# Patient Record
Sex: FEMALE | Race: WHITE | NOT HISPANIC OR LATINO
[De-identification: names, ages, dates, MRNs, and addresses within clinical notes are randomized per-mention and may not be internally consistent; named-entity substitution may affect disease eponyms.]

---

## 2017-03-01 ENCOUNTER — APPOINTMENT (OUTPATIENT)
Dept: ENDOCRINOLOGY | Facility: CLINIC | Age: 62
End: 2017-03-01

## 2019-02-25 ENCOUNTER — RECORD ABSTRACTING (OUTPATIENT)
Age: 64
End: 2019-02-25

## 2019-02-25 DIAGNOSIS — Z85.9 PERSONAL HISTORY OF MALIGNANT NEOPLASM, UNSPECIFIED: ICD-10-CM

## 2019-02-25 DIAGNOSIS — Z83.3 FAMILY HISTORY OF DIABETES MELLITUS: ICD-10-CM

## 2019-02-25 DIAGNOSIS — Z87.09 PERSONAL HISTORY OF OTHER DISEASES OF THE RESPIRATORY SYSTEM: ICD-10-CM

## 2019-02-25 DIAGNOSIS — H65.93 UNSPECIFIED NONSUPPURATIVE OTITIS MEDIA, BILATERAL: ICD-10-CM

## 2019-02-25 DIAGNOSIS — Z86.39 PERSONAL HISTORY OF OTHER ENDOCRINE, NUTRITIONAL AND METABOLIC DISEASE: ICD-10-CM

## 2019-02-25 DIAGNOSIS — H61.21 IMPACTED CERUMEN, RIGHT EAR: ICD-10-CM

## 2019-02-25 DIAGNOSIS — Z80.9 FAMILY HISTORY OF MALIGNANT NEOPLASM, UNSPECIFIED: ICD-10-CM

## 2019-02-25 DIAGNOSIS — Z87.39 PERSONAL HISTORY OF OTHER DISEASES OF THE MUSCULOSKELETAL SYSTEM AND CONNECTIVE TISSUE: ICD-10-CM

## 2019-02-25 DIAGNOSIS — R06.7 SNEEZING: ICD-10-CM

## 2019-02-25 DIAGNOSIS — Z87.19 PERSONAL HISTORY OF OTHER DISEASES OF THE DIGESTIVE SYSTEM: ICD-10-CM

## 2019-02-25 DIAGNOSIS — Z87.898 PERSONAL HISTORY OF OTHER SPECIFIED CONDITIONS: ICD-10-CM

## 2019-02-25 DIAGNOSIS — H65.02 ACUTE SEROUS OTITIS MEDIA, LEFT EAR: ICD-10-CM

## 2019-02-25 DIAGNOSIS — Z82.3 FAMILY HISTORY OF STROKE: ICD-10-CM

## 2019-02-25 DIAGNOSIS — L08.9 LOCAL INFECTION OF THE SKIN AND SUBCUTANEOUS TISSUE, UNSPECIFIED: ICD-10-CM

## 2019-02-25 DIAGNOSIS — Z82.49 FAMILY HISTORY OF ISCHEMIC HEART DISEASE AND OTHER DISEASES OF THE CIRCULATORY SYSTEM: ICD-10-CM

## 2019-02-25 DIAGNOSIS — Z86.79 PERSONAL HISTORY OF OTHER DISEASES OF THE CIRCULATORY SYSTEM: ICD-10-CM

## 2019-02-25 DIAGNOSIS — Z86.69 PERSONAL HISTORY OF OTHER DISEASES OF THE NERVOUS SYSTEM AND SENSE ORGANS: ICD-10-CM

## 2019-02-25 DIAGNOSIS — Z87.891 PERSONAL HISTORY OF NICOTINE DEPENDENCE: ICD-10-CM

## 2019-02-25 DIAGNOSIS — J34.89 OTHER SPECIFIED DISORDERS OF NOSE AND NASAL SINUSES: ICD-10-CM

## 2019-02-25 RX ORDER — METHENAMINE HIPPURATE 1 G/1
1 TABLET ORAL
Refills: 0 | Status: ACTIVE | COMMUNITY

## 2019-02-25 RX ORDER — METFORMIN HYDROCHLORIDE 625 MG/1
TABLET ORAL
Refills: 0 | Status: ACTIVE | COMMUNITY

## 2019-02-25 RX ORDER — ATORVASTATIN CALCIUM 80 MG/1
TABLET, FILM COATED ORAL
Refills: 0 | Status: ACTIVE | COMMUNITY

## 2019-02-25 RX ORDER — HYDROCHLOROTHIAZIDE 12.5 MG/1
TABLET ORAL
Refills: 0 | Status: ACTIVE | COMMUNITY

## 2019-02-25 RX ORDER — ACETAMINOPHEN 325 MG/1
TABLET, FILM COATED ORAL
Refills: 0 | Status: ACTIVE | COMMUNITY

## 2019-02-25 RX ORDER — SITAGLIPTIN 100 MG/1
TABLET, FILM COATED ORAL
Refills: 0 | Status: ACTIVE | COMMUNITY

## 2019-02-25 RX ORDER — PIOGLITAZONE HYDROCHLORIDE 45 MG/1
TABLET ORAL
Refills: 0 | Status: ACTIVE | COMMUNITY

## 2019-02-25 RX ORDER — GLIMEPIRIDE 4 MG/1
TABLET ORAL
Refills: 0 | Status: ACTIVE | COMMUNITY

## 2019-02-25 RX ORDER — VALSARTAN 40 MG/1
TABLET, COATED ORAL
Refills: 0 | Status: ACTIVE | COMMUNITY

## 2019-03-19 ENCOUNTER — APPOINTMENT (OUTPATIENT)
Dept: OTOLARYNGOLOGY | Facility: CLINIC | Age: 64
End: 2019-03-19
Payer: COMMERCIAL

## 2019-03-19 VITALS
BODY MASS INDEX: 42.49 KG/M2 | HEIGHT: 65 IN | DIASTOLIC BLOOD PRESSURE: 80 MMHG | WEIGHT: 255 LBS | SYSTOLIC BLOOD PRESSURE: 150 MMHG | HEART RATE: 90 BPM

## 2019-03-19 VITALS — HEART RATE: 84 BPM | SYSTOLIC BLOOD PRESSURE: 120 MMHG | DIASTOLIC BLOOD PRESSURE: 66 MMHG

## 2019-03-19 VITALS — DIASTOLIC BLOOD PRESSURE: 70 MMHG | SYSTOLIC BLOOD PRESSURE: 116 MMHG

## 2019-03-19 PROCEDURE — 31231 NASAL ENDOSCOPY DX: CPT

## 2019-03-19 PROCEDURE — 99213 OFFICE O/P EST LOW 20 MIN: CPT | Mod: 25

## 2019-03-19 NOTE — ASSESSMENT
[FreeTextEntry1] : It was my impression that her symptoms were most consistent with the flu and I did not see evidence of a purulent sinusitis.  I would treatment with antibiotics should she develop a secondary bacterial infection but I did not so recommend at this point.\par I explained when she is sick her underlying vertigo may exacerbate and this should improve as she feels better. If not I would plan vestibular therapy again.\par Otherwise, I recommended supportive care.\par I suggested topical moisturizing for the nasal cavity and would like to see her back in followup in 2-3 weeks to make sure that she is not developing a secondary sinus infection.

## 2019-03-19 NOTE — CONSULT LETTER
[Dear  ___] : Dear  [unfilled], [Courtesy Letter:] : I had the pleasure of seeing your patient, [unfilled], in my office today. [Please see my note below.] : Please see my note below. [Consult Closing:] : Thank you very much for allowing me to participate in the care of this patient.  If you have any questions, please do not hesitate to contact me. [Sincerely,] : Sincerely, [FreeTextEntry3] : Sidney Ley MD\par [facility]\par [facility]\par  [facility]\par \par

## 2019-03-19 NOTE — HISTORY OF PRESENT ILLNESS
[de-identified] : EDWIN JALLOH is a 63 year old female who comes in a history of chronic sinusitis and ozenoid changes of the left antrum and sensory neural hearing losses.  She notes that she has been sick for about a week with fevers, muscle aches and chills. Her sugars have been high and her dizziness has exacerbated. She was started on Tamiflu but is concerned that she may have a sinus infection. The patient had no other ear nose or throat complaints at this visit.\par

## 2019-03-19 NOTE — PHYSICAL EXAM
[Normal] : normal appearance, well groomed, well nourished, and in no acute distress [FreeTextEntry1] : General:\par The patient was alert and oriented and in no distress.\par Voice was clear.\par Face:\par The patient had no facial asymmetry or mass.\par The skin was unremarkable.\par Ears:\par The external ears were normal without deformity.\par The ear canals were clear.\par The tympanic membranes were intact and normal.\par Neuro:\par Neurologically, the patient was awake, alert, and oriented to person, place and time. There were no obvious focal neurologic abnormalities.  Cranial nerves II through XII were grossly intact.Nasal endoscopy: \par CPT 79288\par Procedure Note:\par \par Nasal endoscopy was done with topical anesthesia of Pontocaine and Afrin and a      nasal endoscope.\par Indication: Nasal congestion, rule out sinusitis.\par Procedure: The nasal cavity was anesthetized with topical Afrin and Pontocaine. An  endoscope was used and inserted into the nasal cavity.\par Attention was first paid to the anterior nasal cavity.\par \par This showed that the nasal mucosa was dry crusting throughout bilaterally but without purulent sinusitis. The crusting was removed.

## 2019-04-04 ENCOUNTER — RX CHANGE (OUTPATIENT)
Age: 64
End: 2019-04-04

## 2019-04-09 ENCOUNTER — APPOINTMENT (OUTPATIENT)
Dept: OTOLARYNGOLOGY | Facility: CLINIC | Age: 64
End: 2019-04-09
Payer: MEDICARE

## 2019-04-09 VITALS
BODY MASS INDEX: 42.49 KG/M2 | WEIGHT: 255 LBS | HEART RATE: 88 BPM | SYSTOLIC BLOOD PRESSURE: 126 MMHG | HEIGHT: 65 IN | DIASTOLIC BLOOD PRESSURE: 66 MMHG

## 2019-04-09 PROCEDURE — 31237 NSL/SINS NDSC SURG BX POLYPC: CPT

## 2019-04-09 PROCEDURE — 99213 OFFICE O/P EST LOW 20 MIN: CPT | Mod: 25

## 2019-04-09 NOTE — CONSULT LETTER
[Dear  ___] : Dear  [unfilled], [Courtesy Letter:] : I had the pleasure of seeing your patient, [unfilled], in my office today. [Please see my note below.] : Please see my note below. [Consult Closing:] : Thank you very much for allowing me to participate in the care of this patient.  If you have any questions, please do not hesitate to contact me. [Sincerely,] : Sincerely, [FreeTextEntry3] : \par Sidney Ley MD\par [facility]\par [facility]\par  [facility]\par \par  [FreeTextEntry2] : MIKAEL DAMON\par

## 2019-04-09 NOTE — PHYSICAL EXAM
[FreeTextEntry1] : General:\par The patient was alert and oriented and in no distress.\par Voice was clear.\par \par Face:\par The patient had no facial asymmetry or mass.\par The skin was unremarkable. She had the orbital asymmetry\par \par Ears:\par The external ears were normal without deformity.\par The ear canals were clear.\par The tympanic membranes were intact and normal.\par \par Neck: \par The neck was symmetrical.\par The parotid and submandibular glands were normal without masses.\par The trachea was midline and there was no unusual crepitus.\par The thyroid was smooth and nontender and no masses were palpated.\par There was no significant cervical adenopathy.\par \par Oral cavity:\par The oral mucosa was normal.\par The oral and base of tongue were clear and without mass.\par The gingival and buccal mucosa were moist and without lesions.\par The palate moved well.\par There was no cleft to the palate.\par There appeared to be good salivary flow.  \par There was no pus, erythema or mass in the oral cavity.\par \par Neuro:\par Neurologically, the patient was awake, alert, and oriented to person, place and time. There were no obvious focal neurologic abnormalities.  Cranial nerves II through XII were grossly intact.\par \par Nasal endoscopy: \par CPT 51339\par Procedure Note:\par \par Nasal endoscopy was done with topical anesthesia of Pontocaine and Afrin and a      nasal endoscope.\par Indication: Nasal congestion, evaluation of response to therapy for sinusitis.\par Procedure: The nasal cavity was anesthetized with topical Afrin and Pontocaine. An  endoscope was used and inserted into the nasal cavity.\par Attention was first paid to the anterior nasal cavity.\par On the right side, the sinuses were patent and the mucosa was much better without polyps purulence or masses.\par On the left, there was a yellow green crust around the entire left antrum.\par Switching to a rigid 30° pediatric endoscope with further anesthesia and a forward biting forceps the crust was removed and then with a curved suction the residual mucosal discharge was cleared.\par

## 2019-04-09 NOTE — ASSESSMENT
[FreeTextEntry1] : It was my impression that she had had a sinusitis that responded to medical care. However, she still has the crusting of the Ozena obstructing the left maxillary sinus. This was debrided to clear and then suctioned.\par At this point I did not suggest other antibiotics. I recommend continuing with nasal rinses and would like to reevaluate in 3 weeks to see if she needs further debridement.

## 2019-04-09 NOTE — HISTORY OF PRESENT ILLNESS
[de-identified] : EDWIN JALLOH Was seen in followup on April 9. She wound up taking the Ceftin and was feeling much better. She takes Diflucan as well. She comes in for posttreatment evaluation.

## 2019-04-24 ENCOUNTER — APPOINTMENT (OUTPATIENT)
Dept: ORTHOPEDIC SURGERY | Facility: CLINIC | Age: 64
End: 2019-04-24
Payer: MEDICARE

## 2019-04-24 VITALS — WEIGHT: 255 LBS | HEIGHT: 65 IN | BODY MASS INDEX: 42.49 KG/M2

## 2019-04-24 DIAGNOSIS — M19.112 POST-TRAUMATIC OSTEOARTHRITIS, LEFT SHOULDER: ICD-10-CM

## 2019-04-24 PROCEDURE — 99214 OFFICE O/P EST MOD 30 MIN: CPT

## 2019-04-24 NOTE — HISTORY OF PRESENT ILLNESS
[Pain Location] : pain [] : left shoulder [Stable] : stable [Intermit.] : ~He/She~ states the symptoms seem to be intermittent [NSAIDs] : relieved by nonsteroidal anti-inflammatory drugs [Opioid Analgesics] : relieved by opioid analgesics [de-identified] : FOLLOW UP\par LEFT SHOULDER\par LEFT SHOULDER JUNE 11, 2015- LEFT ORIF PROXIMAL HUMERUS WITH CORAL GRAFT\par INTERMITTENT PAIN\par PAIN LEVEL 3-4/10\par DULL, ACHY PAIN\par \par \par BETTER WITH MEDICATION- TYLENOL, TRAMADOL, PERCOCET\par WORSE WHEN LAYING DOWN [de-identified] : LAYING DOWN

## 2019-04-24 NOTE — PHYSICAL EXAM
[de-identified] : PHYSICAL EXAM LEFT  SHOULDER\par \par SCAPULAR PROTRACTION\par AROM 100 / 80 \par TENDER: SA REGION / GH JOINT \par \par SPECIAL TESTING :\par GALDAMEZ - POSITIVE \par CHANDA - POSITIVE \par SPEED TEST - POSITIVE\par \par [place]AM - NEGATIVE \par APPREHENSION AND SUPPRESSION - NEGATIVE \par \par RC STRENGTH TESTING \par SS:  5/5\par SUB 5/5\par IS     5/5\par BICEPS  5/5\par \par SENSATION  - GROSSLY INTACT\par \par \par

## 2019-05-02 ENCOUNTER — APPOINTMENT (OUTPATIENT)
Dept: OTOLARYNGOLOGY | Facility: CLINIC | Age: 64
End: 2019-05-02
Payer: MEDICARE

## 2019-05-02 VITALS — HEIGHT: 65 IN | BODY MASS INDEX: 42.49 KG/M2 | WEIGHT: 255 LBS

## 2019-05-02 PROCEDURE — 99213 OFFICE O/P EST LOW 20 MIN: CPT | Mod: 25

## 2019-05-02 PROCEDURE — 31231 NASAL ENDOSCOPY DX: CPT

## 2019-05-02 NOTE — PHYSICAL EXAM
[FreeTextEntry1] : General:\par The patient was alert and oriented and in no distress.\par Voice was clear. She looks somewhat uncomfortable.\par \par Face:\par The patient had no facial asymmetry or mass.\par The skin was unremarkable.\par \par Ears:\par The external ears were normal without deformity.\par The ear canals were clear.\par The tympanic membranes were intact and normal.\par \par Oral cavity:\par The oral mucosa was normal.\par The oral and base of tongue were clear and without mass.\par The gingival and buccal mucosa were moist and without lesions.\par The palate moved well.\par There was no cleft to the palate.\par There appeared to be good salivary flow.  \par There was no pus, erythema or mass in the oral cavity.\par \par Neck: \par The neck was symmetrical.\par The parotid and submandibular glands were normal without masses.\par The trachea was midline and there was no unusual crepitus.\par The thyroid was smooth and nontender and no masses were palpated.\par There was no significant cervical adenopathy.\par \par Eyes: She had the disconjugate gaze\par \par Neuro:\par Neurologically, the patient was awake, alert, and oriented to person, place and time. There were no obvious focal neurologic abnormalities.  Cranial nerves II through XII were grossly intact.\par \par Otoneuro:\par She has a 4-5 beats of left lateral nystagmus\par Finger nose finger and rapid alternating motions were normal.\par Romberg testing was normal.\par \par There was no bruit or thrill in the neck.\par \par \par Nasal endoscopy: \par CPT 39476\par Procedure Note:\par \par Nasal endoscopy was done with topical anesthesia of Pontocaine and Afrin and a      nasal endoscope.\par Indication: Nasal congestion, rule out sinusitis. Evaluation of response to therapy\par Procedure: The nasal cavity was anesthetized with topical Afrin and Pontocaine. An  endoscope was used and inserted into the nasal cavity.\par Attention was first paid to the anterior nasal cavity.\par \par This showed that on the right side the sinuses were patent and clear the septum relatively midline in the nasopharynx benign.\par On the left, there was no recurrence of the ozenoid crusting and the maxillary antrum was clear of disease although there was some persisting inflammatory change the eustachian tube orifices were clear and the nasopharynx benign.\par \par \par

## 2019-05-02 NOTE — HISTORY OF PRESENT ILLNESS
[de-identified] : EDWIN JALLOH Was seen in followup on May 2 as an emergency. She has the previous history of the ozenoid sinusitis on the left. This has improved. However yesterday, she woke up with the acute onset of a spinning vertigo. She has had this in the past. She denies any tinnitus or otalgia or change in hearing. The patient had no other ear nose or throat complaints at this visit.

## 2019-05-02 NOTE — CONSULT LETTER
[Dear  ___] : Dear  [unfilled], [Please see my note below.] : Please see my note below. [Courtesy Letter:] : I had the pleasure of seeing your patient, [unfilled], in my office today. [Sincerely,] : Sincerely, [FreeTextEntry2] : MIKAEL DAMON\par

## 2019-05-02 NOTE — ASSESSMENT
[FreeTextEntry1] : It was my impression that she was doing well as far as her ozenoid sinusitis. 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 I reviewed the pathogenesis. I did not repeat her hearing test today if the symptoms persist.\par I suggested meclizine when necessary for symptomatic relief and vestibular exercises. I will see her back in followup and if not responding would recommend further intervention including vestibular therapy.

## 2019-05-07 ENCOUNTER — APPOINTMENT (OUTPATIENT)
Dept: ORTHOPEDIC SURGERY | Facility: CLINIC | Age: 64
End: 2019-05-07
Payer: MEDICARE

## 2019-05-07 VITALS — RESPIRATION RATE: 16 BRPM | BODY MASS INDEX: 42.49 KG/M2 | HEIGHT: 65 IN | WEIGHT: 255 LBS

## 2019-05-07 DIAGNOSIS — M12.519 TRAUMATIC ARTHROPATHY, UNSPECIFIED SHOULDER: ICD-10-CM

## 2019-05-07 PROCEDURE — 99204 OFFICE O/P NEW MOD 45 MIN: CPT

## 2019-05-09 NOTE — PHYSICAL EXAM
[de-identified] : CONSTITUTIONAL: Patient is well-developed, well-nourished and appropriately groomed.\par SKIN: There are no rashes, no ulcers, and no café au lait spots in the upper extremities, face or cervical region.\par CARDIOVASCULAR: Both distal upper extremities are warm and the fingers have good capillary refill. Normal radial pulses bilaterally.\par RESPIRATORY: The patient is breathing normally and in no acute distress. \par HEMATOLOGICAL/LYMPHATIC: There is no lymphedema in either upper extremity. No cervical adenopathy, no axillary adenopathy.\par PSYCH: The patient is alert and oriented x 3;  appropriately groomed and dressed.\par NEUROLOGIC: Normal gait and station.\par MUSCULOSKELETAL:\par Left shoulder has an anterior deltopectoral incision that is well-healed without erythema and without swelling. Passive forward elevation 150°, 50° external rotation, and internal rotation to the fifth lumbar vertebra. There is no crepitus with passive rotation of the glenohumeral joint but she can actively raise the arm only 45° and has weakness of external rotation. There is no focal tenderness in the shoulder.\par \par The contralateral shoulder has full, painless active and passive range of motion with 170° forward elevation, 70° external rotation, and internal rotation to the seventh thoracic vertebra. There is no focal tenderness and no crepitus with passive rotation of the glenohumeral joint.\par \par The cervical spine has full passive and active range of motion without pain. There is no focal tenderness in the paracervical muscles nor in the trapezius or parascapular muscles. Distally the motor and sensory exam is normal in both upper extremities.\par \par Distally, the hands are warm and well perfused with no signs of lymphedema or swelling. The radial pulse is present and normal.

## 2019-05-09 NOTE — HISTORY OF PRESENT ILLNESS
[de-identified] : Ms. Arguello tripped and fell on her nondominant left shoulder in June 2015 sustaining a displaced proximal humerus fracture that was treated by open reduction internal fixation under the care of Dr. Brian Delgado .\par \par She underwent a extensive course of physical therapy postoperatively and today presents to my office because of persistent pain and weakness in the left shoulder that precludes activities of daily living requiring overhead use. She denies night pain but has no function of the left arm above shoulder level. She currently takes tramadol and Tylenol for pain relief.

## 2019-05-09 NOTE — CONSULT LETTER
[Dear  ___] : Dear  [unfilled], [FreeTextEntry1] : Today I had the pleasure of evaluating your very nice patient EDWIN JALLOH who requested that I share my findings with you. I very much appreciate the referral. \par \par Please review my office note below and, needless to say, please call or email me with any questions or concerns.\par \par I appreciate the opportunity to participate in her care.\par \par Sincerely,\par \par Harris Erwin MD\par Director, Orthopaedic Surgery\par and [employer] [employer] \par [facility]\par Office: [phone]\par Cell: [phone]\par Email: [email or web address] Website: [email or web address] \par \par \par \par

## 2019-05-09 NOTE — DISCUSSION/SUMMARY
[Medication Risks Reviewed] : Medication risks reviewed [Surgical risks reviewed] : Surgical risks reviewed [de-identified] : I reviewed radiographs of the left shoulder performed in 2018 that shows a proximal humeral fracture plate was then intercalary fibular bone graft in the shaft of the humerus. The head of the humerus has healed in a parous position but there is a well preserved glenohumeral joint space. There is no evidence of a greater tuberosity.\par \par I explained to Ms. Arguello that her primary complaint today is loss of function and strength of the arm rather than pain. For this reason I do not believe she is a surgical candidate today and I recommended that she dedicate herself to a two-month exercise program focusing on improving strength of the deltoid muscle. I instructed her in these exercises and gave her my home exercise sheet.\par \par I explained that if after a dedicated commitment to strengthening in the next 2 months, she continues to have disabling weakness and pain in the shoulder that compromises the quality of her life, I would then review with her the indication and recovery following conversion reverse total shoulder arthroplasty.\par \par I will reevaluate her in 2 months and repeat x-rays of the left shoulder at that time. Today her clinical left shoulder [employer] score is 40 on a scale of 100 indicating marked compromise of shoulder function.

## 2019-05-24 ENCOUNTER — APPOINTMENT (OUTPATIENT)
Dept: OTOLARYNGOLOGY | Facility: CLINIC | Age: 64
End: 2019-05-24
Payer: MEDICARE

## 2019-05-24 PROCEDURE — 31231 NASAL ENDOSCOPY DX: CPT

## 2019-05-24 PROCEDURE — 99213 OFFICE O/P EST LOW 20 MIN: CPT | Mod: 25

## 2019-05-24 RX ORDER — FLUCONAZOLE 200 MG/1
200 TABLET ORAL DAILY
Qty: 3 | Refills: 0 | Status: DISCONTINUED | COMMUNITY
Start: 2019-04-04 | End: 2019-05-24

## 2019-05-24 RX ORDER — MECLIZINE HYDROCHLORIDE 12.5 MG/1
12.5 TABLET ORAL
Qty: 50 | Refills: 1 | Status: DISCONTINUED | COMMUNITY
Start: 2019-05-02 | End: 2019-05-24

## 2019-05-24 RX ORDER — FLUCONAZOLE 50 MG/1
50 TABLET ORAL
Qty: 3 | Refills: 1 | Status: DISCONTINUED | COMMUNITY
Start: 2019-04-04 | End: 2019-05-24

## 2019-05-24 RX ORDER — OXYCODONE AND ACETAMINOPHEN 7.5; 325 MG/1; MG/1
7.5-325 TABLET ORAL
Qty: 60 | Refills: 0 | Status: DISCONTINUED | COMMUNITY
Start: 2019-04-24 | End: 2019-05-24

## 2019-05-24 NOTE — PHYSICAL EXAM
[FreeTextEntry1] : General:\par The patient was alert and oriented and in no distress.\par Voice was clear. She looks somewhat uncomfortable.\par \par Face:\par The patient had no facial asymmetry or mass.\par The skin was unremarkable.\par \par Ears:\par The external ears were normal without deformity.\par The ear canals were clear.\par The tympanic membranes were intact and normal.\par \par Oral cavity:\par The oral mucosa was normal.\par The oral and base of tongue were clear and without mass.\par The gingival and buccal mucosa were moist and without lesions.\par The palate moved well.\par There was no cleft to the palate.\par There appeared to be good salivary flow.  \par There was no pus, erythema or mass in the oral cavity.\par \par Neck: \par The neck was symmetrical.\par The parotid and submandibular glands were normal without masses.\par The trachea was midline and there was no unusual crepitus.\par The thyroid was smooth and nontender and no masses were palpated.\par There was no significant cervical adenopathy.\par \par Eyes: She had the disconjugate gaze\par \par Neuro:\par Neurologically, the patient was awake, alert, and oriented to person, place and time. There were no obvious focal neurologic abnormalities.  Cranial nerves II through XII were grossly intact.\par \par \par Nasal endoscopy: \par CPT 66030\par Procedure Note:\par \par Nasal endoscopy was done with topical anesthesia of Pontocaine and Afrin and a rigid     nasal endoscope.\par Indication: Nasal congestion, rule out sinusitis, epistaxis. Evaluation of response to therapy\par Procedure: The nasal cavity was anesthetized with topical Afrin and Pontocaine. An  endoscope was used and inserted into the nasal cavity.\par Attention was first paid to the anterior nasal cavity.\par \par This showed that on the right side the sinuses were patent and clear the septum relatively midline and the nasopharynx benign.\par On the left, there was no recurrence of the ozenoid crusting and the maxillary antrum was clear of disease although there was some persisting inflammatory change the eustachian tube orifices were clear and the nasopharynx benign.\par She had a large eschar on the left mid septum- this was not removed.  There was no mass or persistent bleeding.  The superior meati were clear and there was no signficant turbinate pathology\par \par

## 2019-05-24 NOTE — CONSULT LETTER
[Dear  ___] : Dear  [unfilled], [Courtesy Letter:] : I had the pleasure of seeing your patient, [unfilled], in my office today. [Please see my note below.] : Please see my note below. [Sincerely,] : Sincerely, [FreeTextEntry2] : MIKAEL DAMON\par  [FreeTextEntry3] : Sidney Ley MD\par [facility]\par [facility]\par  [facility]\par \par

## 2019-05-24 NOTE — ASSESSMENT
[FreeTextEntry1] : It was my impression that she has the persistent ozena without significant crusting at this time.\par She had digital trauma and has a large eschar on the left mid septum. I suggested nasal rinses and mupirocin but did not debride at this time.\par Her vestibular dysfunction has improved with vestibular exercises and I recommended continuing.\par I will see her back in followup in a month or as needed

## 2019-05-24 NOTE — HISTORY OF PRESENT ILLNESS
[de-identified] : EDWIN JALLOH Was seen in followup on May 24th.  I had seen her 3 weeks ago as an emergency with  the acute onset of a spinning vertigo. This has resolved with vestibular exercises.  She is complaining of having a recent left sided epistaxis after digital trauma. She has the previous history of the ozenoid sinusitis on the left. This has improved.  She denies any tinnitus or otalgia or change in hearing. The patient had no other ear nose or throat complaints at this visit.

## 2019-06-18 ENCOUNTER — APPOINTMENT (OUTPATIENT)
Dept: OTOLARYNGOLOGY | Facility: CLINIC | Age: 64
End: 2019-06-18
Payer: MEDICARE

## 2019-06-18 VITALS
BODY MASS INDEX: 42.49 KG/M2 | SYSTOLIC BLOOD PRESSURE: 117 MMHG | WEIGHT: 255 LBS | HEIGHT: 65 IN | DIASTOLIC BLOOD PRESSURE: 62 MMHG | HEART RATE: 91 BPM

## 2019-06-18 PROCEDURE — 31237 NSL/SINS NDSC SURG BX POLYPC: CPT | Mod: LT

## 2019-06-18 NOTE — CONSULT LETTER
[Dear  ___] : Dear  [unfilled], [Courtesy Letter:] : I had the pleasure of seeing your patient, [unfilled], in my office today. [Please see my note below.] : Please see my note below. [Consult Closing:] : Thank you very much for allowing me to participate in the care of this patient.  If you have any questions, please do not hesitate to contact me. [Sincerely,] : Sincerely, [FreeTextEntry2] : MIKAEL DAMON\par  [FreeTextEntry3] : Sidney Ley MD\par [facility]\par [facility]\par  [facility]\par \par

## 2019-06-18 NOTE — ASSESSMENT
[FreeTextEntry1] : It is my impression that she has recurrent or sphenoid sinusitis causing crusting and obstruction and later secondary exacerbation of her chronic sinusitis. She had stopped using a medicated irrigation because her back was bothering her.\par Sinuses were debrided on the left and I placed her back on mupirocin rinses which usually help. Antibiotics in the sinus directly does not seem to help her much and I did not want to treat this with another oral antibiotic if possible.\par She will call me if she is not improving and otherwise I suggested repeat debridement if necessary in a month

## 2019-06-18 NOTE — HISTORY OF PRESENT ILLNESS
[de-identified] : EDWIN JALLOH Was seen on June 18. She comes in now feeling as if she has a sinus infection on the left side. She had her back and it stopped using the nasal rinses. She has some green-colored discharge on the left but not as much foul odor and is previously.\par Intra sinus antibiotics have not helped previously.\par She denies right-sided symptoms\par She has the history of sensorineural hearing losses and the recurrent ozenoid sinusitis on the left.\par The patient had no other ear nose or throat complaints at this visit.

## 2019-06-18 NOTE — PHYSICAL EXAM
[FreeTextEntry1] : General:\par The patient was alert and oriented and in no distress.\par Voice was clear.\par \par Face:\par The patient had no facial asymmetry or mass.\par The skin was unremarkable.\par \par Ears:\par The external ears were normal without deformity.\par The ear canals were clear.\par The tympanic membranes were intact and normal.\par \par Oral cavity:\par The oral mucosa was normal.\par The oral and base of tongue were clear and without mass.\par The gingival and buccal mucosa were moist and without lesions.\par The palate moved well.\par There was no cleft to the palate.\par There appeared to be good salivary flow.  \par There was no pus, erythema or mass in the oral cavity.\par \par Neuro:\par Neurologically, the patient was awake, alert, and oriented to person, place and time. There were no obvious focal neurologic abnormalities.  Cranial nerves II through XII were grossly intact.\par \par Neck: \par The neck was symmetrical.\par The parotid and submandibular glands were normal without masses.\par The trachea was midline and there was no unusual crepitus.\par The thyroid was smooth and nontender and no masses were palpated.\par There was no significant cervical adenopathy.\par \par  [de-identified] : Surgical endoscopy CPT 58827\par Preoperative diagnosis: Left maxillary and ethmoid sinusitis, Ozena\par  postoperative diagnosis: Same\par Procedure: Endoscopic debridement of left maxillary and ethmoid sinuses\par Surgeon: Av\par \par Findings the patient has been recurrent ozenoid sinusitis.  She has noticed increased crusting and comes in for repeat debridement.\par Nasal cavity was anesthetized topically and locally. First the 0° endoscope was used and inserted into the nasal cavity. Using the 0° elevator the 30° endoscope and straight and curved suctions and up-biting forceps the patient was found to have a large bright green crust filling the opening of the maxillary sinus and much of the sinus itself. This was suctioned and then sharply removed in one large crust. Beyond that, green discharge was suctioned from the antrum without complication\par

## 2019-06-21 ENCOUNTER — APPOINTMENT (OUTPATIENT)
Dept: OTOLARYNGOLOGY | Facility: CLINIC | Age: 64
End: 2019-06-21

## 2019-07-16 ENCOUNTER — APPOINTMENT (OUTPATIENT)
Dept: ORTHOPEDIC SURGERY | Facility: CLINIC | Age: 64
End: 2019-07-16

## 2019-07-16 ENCOUNTER — APPOINTMENT (OUTPATIENT)
Dept: OTOLARYNGOLOGY | Facility: CLINIC | Age: 64
End: 2019-07-16
Payer: MEDICARE

## 2019-07-16 PROCEDURE — 99213 OFFICE O/P EST LOW 20 MIN: CPT | Mod: 25

## 2019-07-16 PROCEDURE — 31231 NASAL ENDOSCOPY DX: CPT

## 2019-07-16 NOTE — ASSESSMENT
[FreeTextEntry1] : It was my impression that she was doing quite well. I did not see any recurrence of a significant crusting or mucosal disease. I recommend continuing on the mupirocin rinses.\par She was managed further evaluation of her hearing at this time and I suggested repeat evaluation in a month or earlier if needed

## 2019-07-16 NOTE — CONSULT LETTER
[Dear  ___] : Dear  [unfilled], [Courtesy Letter:] : I had the pleasure of seeing your patient, [unfilled], in my office today. [Please see my note below.] : Please see my note below. [Consult Closing:] : Thank you very much for allowing me to participate in the care of this patient.  If you have any questions, please do not hesitate to contact me. [Sincerely,] : Sincerely, [FreeTextEntry2] : MIKAEL DAMON\par  [FreeTextEntry3] : Sidney Ley MD\par [facility]\par [facility]\par  [facility]\par \par

## 2019-07-16 NOTE — HISTORY OF PRESENT ILLNESS
[de-identified] : EDWIN JALLOH Was seen on July 16th in follow up.  I had last seen her a month ago and she feels improved with mupiricin rinses  She denies any significant left sided malodor or crusting. 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

## 2019-07-16 NOTE — PHYSICAL EXAM
[FreeTextEntry1] : General:\par The patient was alert and oriented and in no distress.\par Voice was clear.\par \par Face:\par The patient had no facial asymmetry or mass.\par The skin was unremarkable.\par She has the orbital asymmetry\par \par Oral cavity:\par The oral mucosa was normal.\par The oral and base of tongue were clear and without mass.\par The gingival and buccal mucosa were moist and without lesions.\par The palate moved well.\par There was no cleft to the palate.\par There appeared to be good salivary flow.  \par There was no pus, erythema or mass in the oral cavity.\par \par Neck: \par The neck was symmetrical.\par The parotid and submandibular glands were normal without masses.\par The trachea was midline and there was no unusual crepitus.\par The thyroid was smooth and nontender and no masses were palpated.\par There was no significant cervical adenopathy.\par \par Neuro:\par Neurologically, the patient was awake, alert, and oriented to person, place and time. There were no obvious focal neurologic abnormalities.  Cranial nerves II through XII were grossly intact. [de-identified] : Nasal endoscopy:\par \par Nasal endoscopy was done with topical anesthesia and a flexible endoscope to evaluate for nasal polyps, chronic sinusitis and response to therapy.\par Endocoscopy was performed to inspect the interior of the nasal cavity, the nasal septum,  the middle and superior meati, the inferior, middle and superior turbinates, and the spheno-ethmoidal  recesses, the nasopharynx and eustachian tube orifices bilaterally\par Evaluation showed that the septum was midline.  There was no significant mucosal disease.  The inferior turbinates and middle turbinates were normal.  On both sides, the surgically opened ethmoids, antra, and frontal recesses were clear.  There was no evidence of polypoid recurrences and no purulence.  The mucosa was close to stage zero.  The nasopharynx was benign.    The middle and  superior meati were normal and the sphenoethmoidal recesses were without evidence of disease\par \par There was a slight dry patch on the left side of the septum but the antrum, evaluated 360° looked excellent without evidence of recurrent mucositis or crusting

## 2019-09-23 ENCOUNTER — APPOINTMENT (OUTPATIENT)
Dept: OTOLARYNGOLOGY | Facility: CLINIC | Age: 64
End: 2019-09-23
Payer: MEDICARE

## 2019-09-23 VITALS
HEART RATE: 84 BPM | HEIGHT: 65 IN | SYSTOLIC BLOOD PRESSURE: 127 MMHG | WEIGHT: 255 LBS | BODY MASS INDEX: 42.49 KG/M2 | DIASTOLIC BLOOD PRESSURE: 66 MMHG

## 2019-09-23 PROCEDURE — 99213 OFFICE O/P EST LOW 20 MIN: CPT | Mod: 25

## 2019-09-23 PROCEDURE — 31231 NASAL ENDOSCOPY DX: CPT

## 2019-09-23 NOTE — HISTORY OF PRESENT ILLNESS
[de-identified] : EDWIN JALLOH is a 64 year old female who comes in complaining of having had what she felt was an early sinus infection. She took 4 days of Ceftin and then stopped because she thought that her risk may be hurting her more from the medication. She at one and hurt her wrist. 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

## 2019-09-23 NOTE — CONSULT LETTER
[Dear  ___] : Dear  [unfilled], [Courtesy Letter:] : I had the pleasure of seeing your patient, [unfilled], in my office today. [Please see my note below.] : Please see my note below. [Sincerely,] : Sincerely, [Consult Closing:] : Thank you very much for allowing me to participate in the care of this patient.  If you have any questions, please do not hesitate to contact me. [FreeTextEntry2] : MIKAEL DAMON\par  [FreeTextEntry3] : Sidney Ley MD\par [facility]\par [facility]\par  [facility]\par \par

## 2019-09-23 NOTE — PHYSICAL EXAM
[FreeTextEntry1] : \par The patient was alert and oriented and in no distress.\par Voice was clear.\par \par Face:\par The patient had no facial asymmetry or mass.\par The skin was unremarkable.\par \par \par Nose: \par The external nose had no significant deformity.  There was no facial tenderness.  On anterior rhinoscopy, the nasal mucosa was clear.  The anterior septum was midline.  There were no visualized polyps purulence  or masses.\par \par Oral cavity:\par The oral mucosa was normal.\par The oral and base of tongue were clear and without mass.\par The gingival and buccal mucosa were moist and without lesions.\par The palate moved well.\par There was no cleft to the palate.\par There appeared to be good salivary flow.  \par There was no pus, erythema or mass in the oral cavity.\par \par \par Ears:\par The external ears were normal without deformity.\par The ear canals were clear.\par The tympanic membranes were intact and normal.\par \par Neck: \par The neck was symmetrical.\par The parotid and submandibular glands were normal without masses.\par The trachea was midline and there was no unusual crepitus.\par The thyroid was smooth and nontender and no masses were palpated.\par There was no significant cervical adenopathy.\par \par \par Neuro:\par Neurologically, the patient was awake, alert, and oriented to person, place and time. There were no obvious focal neurologic abnormalities.  Cranial nerves II through XII were grossly intact.\par \par \par TMJ:\par The temporomandibular joints were nontender.\par There was no abnormal crepitus and no significant malocclusion\par \par Nasal endoscopy:\par \par Nasal endoscopy was done with topical anesthesia and a flexible endoscope to evaluate for nasal polyps, chronic sinusitis and response to therapy.\par Endocoscopy was performed to inspect the interior of the nasal cavity, the nasal septum,  the middle and superior meati, the inferior, middle and superior turbinates, and the spheno-ethmoidal  recesses, the nasopharynx and eustachian tube orifices bilaterally\par Evaluation showed that the septum was midline.  There was no significant mucosal disease.  The inferior turbinates and middle turbinates were normal.  On both sides, the surgically opened ethmoids, antra, and frontal recesses were clear.  There was no evidence of polypoid recurrences and no purulence.  The mucosa was close to stage zero.  The nasopharynx was benign.    The middle and  superior meati were normal and the sphenoethmoidal recesses were without evidence of disease\par

## 2019-09-23 NOTE — ASSESSMENT
[FreeTextEntry1] : It was my impression that she was doing quite well. I did not see any recurrence of a significant crusting or mucosal disease. I recommend continuing on the mupirocin rinses.\par I explained that I could not say for sure whether 4 days of antibiotics helped, but it seems more likely that she did not have a sinus infection.\par In any case I reassured her and again recommended not self treating. I did still give her a prescription for Ceftin to hold onto but she promised not to use it without first on

## 2019-09-25 ENCOUNTER — APPOINTMENT (OUTPATIENT)
Dept: ORTHOPEDIC SURGERY | Facility: CLINIC | Age: 64
End: 2019-09-25
Payer: MEDICARE

## 2019-09-25 VITALS — WEIGHT: 255 LBS | HEIGHT: 65 IN | RESPIRATION RATE: 16 BRPM | BODY MASS INDEX: 42.49 KG/M2

## 2019-09-25 PROCEDURE — 73110 X-RAY EXAM OF WRIST: CPT | Mod: 50

## 2019-09-25 PROCEDURE — 99203 OFFICE O/P NEW LOW 30 MIN: CPT

## 2019-10-03 ENCOUNTER — APPOINTMENT (OUTPATIENT)
Dept: ORTHOPEDIC SURGERY | Facility: CLINIC | Age: 64
End: 2019-10-03
Payer: MEDICARE

## 2019-10-03 VITALS — BODY MASS INDEX: 42.49 KG/M2 | HEIGHT: 65 IN | WEIGHT: 255 LBS | RESPIRATION RATE: 16 BRPM

## 2019-10-03 PROCEDURE — 99214 OFFICE O/P EST MOD 30 MIN: CPT

## 2019-10-03 PROCEDURE — 73110 X-RAY EXAM OF WRIST: CPT

## 2019-10-14 ENCOUNTER — APPOINTMENT (OUTPATIENT)
Dept: OTOLARYNGOLOGY | Facility: CLINIC | Age: 64
End: 2019-10-14
Payer: MEDICARE

## 2019-10-14 VITALS
HEIGHT: 65 IN | HEART RATE: 89 BPM | WEIGHT: 255 LBS | BODY MASS INDEX: 42.49 KG/M2 | SYSTOLIC BLOOD PRESSURE: 117 MMHG | DIASTOLIC BLOOD PRESSURE: 66 MMHG

## 2019-10-14 PROCEDURE — 31231 NASAL ENDOSCOPY DX: CPT

## 2019-10-14 PROCEDURE — 99213 OFFICE O/P EST LOW 20 MIN: CPT | Mod: 25

## 2019-10-14 RX ORDER — CEFUROXIME AXETIL 250 MG/1
250 TABLET ORAL
Refills: 0 | Status: DISCONTINUED | COMMUNITY
End: 2019-10-14

## 2019-10-14 RX ORDER — CEFUROXIME AXETIL 250 MG/1
250 TABLET ORAL
Qty: 20 | Refills: 1 | Status: DISCONTINUED | COMMUNITY
Start: 2019-09-23 | End: 2019-10-14

## 2019-10-14 NOTE — CONSULT LETTER
[Dear  ___] : Dear  [unfilled], [Courtesy Letter:] : I had the pleasure of seeing your patient, [unfilled], in my office today. [Consult Closing:] : Thank you very much for allowing me to participate in the care of this patient.  If you have any questions, please do not hesitate to contact me. [Please see my note below.] : Please see my note below. [Sincerely,] : Sincerely, [FreeTextEntry2] : MIKAEL DAMON\par  [FreeTextEntry3] : Sidney Ley MD\par [facility]\par [facility]\par  [facility]\par \par

## 2019-10-14 NOTE — HISTORY OF PRESENT ILLNESS
[de-identified] : EDWIN JALLOH is a 64 year old female who was seen in follow up on October 14th.  She's not having any further purulence but comes in for evaluation. She feels that she is doing well. 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

## 2019-10-14 NOTE — ASSESSMENT
[FreeTextEntry1] : It was my impression that she was doing quite well. I did not see any recurrence of a significant crusting or mucosal disease. I recommend continuing on the mupirocin rinses.\par I reassured her and did not just other intervention but will see her back in followup in a month to see if she needs repeated debridement.

## 2019-10-21 ENCOUNTER — APPOINTMENT (OUTPATIENT)
Dept: ORTHOPEDIC SURGERY | Facility: CLINIC | Age: 64
End: 2019-10-21
Payer: MEDICARE

## 2019-10-21 VITALS — HEIGHT: 65 IN | RESPIRATION RATE: 16 BRPM | WEIGHT: 255 LBS | BODY MASS INDEX: 42.49 KG/M2

## 2019-10-21 PROCEDURE — 73110 X-RAY EXAM OF WRIST: CPT | Mod: LT

## 2019-10-21 PROCEDURE — 99213 OFFICE O/P EST LOW 20 MIN: CPT

## 2019-10-28 ENCOUNTER — APPOINTMENT (OUTPATIENT)
Dept: OTOLARYNGOLOGY | Facility: CLINIC | Age: 64
End: 2019-10-28
Payer: MEDICARE

## 2019-10-28 VITALS
WEIGHT: 255 LBS | DIASTOLIC BLOOD PRESSURE: 74 MMHG | SYSTOLIC BLOOD PRESSURE: 118 MMHG | HEIGHT: 65 IN | BODY MASS INDEX: 42.49 KG/M2 | HEART RATE: 102 BPM

## 2019-10-28 PROCEDURE — 99213 OFFICE O/P EST LOW 20 MIN: CPT | Mod: 25

## 2019-10-28 PROCEDURE — 31231 NASAL ENDOSCOPY DX: CPT

## 2019-10-28 NOTE — REVIEW OF SYSTEMS
[Ear Pain] : ear pain [Throat Clearing] : throat clearing [Chills] : chills [Feeling Poorly] : feeling poorly [Feeling Tired] : feeling tired [Joint Stiffness] : joint stiffness [Negative] : Heme/Lymph

## 2019-10-29 NOTE — PHYSICAL EXAM
[FreeTextEntry1] : General:\par The patient was alert and oriented and in no distress.\par Voice was clear.\par She looked as if she did not feel well\par \par Oral cavity:\par The oral mucosa was normal.\par The oral and base of tongue were clear and without mass.\par The gingival and buccal mucosa were moist and without lesions.\par The palate moved well.\par There was no cleft to the palate.\par There appeared to be good salivary flow.  \par There was no pus, erythema or mass in the oral cavity.\par \par Neck: \par The neck was symmetrical.\par The parotid and submandibular glands were normal without masses.\par The trachea was midline and there was no unusual crepitus.\par The thyroid was smooth and nontender and no masses were palpated.\par There was no significant cervical adenopathy.\par She has slight tenderness of the submandibular glands\par \par Face:\par The patient had no facial asymmetry or mass.\par The skin was unremarkable.\par She has the orbital asymmetry\par \par Ears:\par The right ear canal was clear the right eardrum was intact and mobile.\par The left ear canal was mildly inflamed that she had erythema and a left acute otitis media.\par \par \par  [de-identified] : Nasal endoscopy:\par \par Nasal endoscopy was done with topical anesthesia and a flexible endoscope to evaluate for nasal polyps, chronic sinusitis and response to therapy.\par Endocoscopy was performed to inspect the interior of the nasal cavity, the nasal septum,  the middle and superior meati, the inferior, middle and superior turbinates, and the spheno-ethmoidal  recesses, the nasopharynx and eustachian tube orifices bilaterally\par Evaluation showed that the septum was midline.  There was no significant mucosal disease.  The inferior turbinates and middle turbinates were normal.  On both sides, the surgically opened ethmoids, antra, and frontal recesses were clear.  There was no evidence of polypoid recurrences and no purulence.  The mucosa was close to stage zero.  The nasopharynx was benign.    The middle and  superior meati were normal and the sphenoethmoidal recesses were without evidence of disease

## 2019-10-29 NOTE — ASSESSMENT
[FreeTextEntry1] : It was my impression that she has a flulike syndrome with fever and myalgias but now has secondary left-sided acute otitis media.\par She has the history of ozena but no evidence of sinusitis today\par I recommended supportive care for her viral syndrome and cefuroxime for her acute otitis media\par I would like to see her back if this fails to resolve or otherwise in a month

## 2019-10-29 NOTE — HISTORY OF PRESENT ILLNESS
[de-identified] : EDWIN JALLOH is a 64 year old female who comes in complaining of Having had an upper respiratory tract infection for about 2 days. Since last night, she has had left-sided otalgia and the fever and malaise. She does not have purulent discharge. She has a long history of ozena, chronic sinusitis, reflux and temporomandibular joint dysfunction. The patient had no other ear nose or throat complaints at this visit.

## 2019-10-29 NOTE — CONSULT LETTER
[FreeTextEntry2] : MIKAEL DAMON\par  [FreeTextEntry1] : \par \par Dear  Dr. MIKAEL DAMON,\par \par I had the pleasure of seeing your patient today.  \par Please see my note below.\par \par \par Thank you very much for allowing me to participate in the care of your patient.\par \par Sincerely,\par \par \par Sidney Ley MD\par [facility]\par [facility]\par  [facility]\par \par

## 2019-11-04 ENCOUNTER — APPOINTMENT (OUTPATIENT)
Dept: OTOLARYNGOLOGY | Facility: CLINIC | Age: 64
End: 2019-11-04
Payer: MEDICARE

## 2019-11-04 VITALS
BODY MASS INDEX: 42.49 KG/M2 | DIASTOLIC BLOOD PRESSURE: 74 MMHG | WEIGHT: 255 LBS | HEIGHT: 65 IN | HEART RATE: 92 BPM | SYSTOLIC BLOOD PRESSURE: 132 MMHG

## 2019-11-04 DIAGNOSIS — Z86.69 PERSONAL HISTORY OF OTHER DISEASES OF THE NERVOUS SYSTEM AND SENSE ORGANS: ICD-10-CM

## 2019-11-04 PROCEDURE — 31231 NASAL ENDOSCOPY DX: CPT

## 2019-11-04 PROCEDURE — 99213 OFFICE O/P EST LOW 20 MIN: CPT | Mod: 25

## 2019-11-04 NOTE — ASSESSMENT
[FreeTextEntry1] : It was my impression that she has a Resolving acute otitis media and now has an effusion.\par I explained this to her.\par I suggested finishing off her course of antibiotics And added Flonase..\par She is able to insufflate and I recommended continuing to do so\par She has required myringotomy in the past but I certainly would not recommend at this time as it seems like she will respond to medical care alone.\par I assume that her middle ear infection as the cause of her elevated sugars but asked her to speak to Dr. Haynes further.\par She has the history of ozena but no evidence of sinusitis today\par

## 2019-11-04 NOTE — REVIEW OF SYSTEMS
[Hearing Loss] : hearing loss [Negative] : Heme/Lymph [Ear Pain] : no ear pain [Throat Clearing] : throat clearing [Chills] : chills [Feeling Poorly] : feeling poorly [Feeling Tired] : feeling tired [Joint Stiffness] : joint stiffness

## 2019-11-04 NOTE — PHYSICAL EXAM
[FreeTextEntry1] : General:\par The patient was alert and oriented and in no distress.\par Voice was clear.\par She looked as if she did not feel well\par \par Oral cavity:\par The oral mucosa was normal.\par The oral and base of tongue were clear and without mass.\par The gingival and buccal mucosa were moist and without lesions.\par The palate moved well.\par There was no cleft to the palate.\par There appeared to be good salivary flow.  \par There was no pus, erythema or mass in the oral cavity.\par \par Neck: \par The neck was symmetrical.\par The parotid and submandibular glands were normal without masses.\par The trachea was midline and there was no unusual crepitus.\par The thyroid was smooth and nontender and no masses were palpated.\par There was no significant cervical adenopathy.\par She has slight tenderness of the submandibular glands\par \par Face:\par The patient had no facial asymmetry or mass.\par The skin was unremarkable.\par She has the orbital asymmetry\par \par Ears:\par The right ear canal was clear the right eardrum was intact and mobile.\par The left ear canal was Clear.  She had an effusion without inflammation or erythema on the left\par \par \par  [de-identified] : Nasal endoscopy: \par CPT 22070\par Procedure Note:\par \par Nasal endoscopy was done with topical anesthesia of Pontocaine and Afrin and a      nasal endoscope.\par Indication: Nasal congestion, rule out sinusitis.\par Procedure: The nasal cavity was anesthetized with topical Afrin and Pontocaine. An  endoscope was used and inserted into the nasal cavity.\par Attention was first paid to the anterior nasal cavity.\par Endocoscopy was performed to inspect the interior of the nasal cavity, the nasal septum,  the middle and superior meati, the inferior, middle and superior turbinates, and the spheno-ethmoidal  recesses, the nasopharynx and eustachian tube orifices bilaterally. \par All findings were normal except:\par This showed that the sinuses were patent bilaterally and she did not have a recurrence of her also noted sinusitis on the left. Nasopharynx and eustachian tube orifices were benign\par \par \par She was able to insufflate on the left

## 2019-11-04 NOTE — HISTORY OF PRESENT ILLNESS
[de-identified] : EDWIN JALLOH \jus Seen in followup on November 4. I had seen her a week ago with an acute otitis media and she comes in concerned about her sugars and because her hearing is not improved. The pain, however is better.\par \par She has a long history of ozena, chronic sinusitis, reflux and temporomandibular joint dysfunction. The patient had no other ear nose or throat complaints at this visit.

## 2019-11-04 NOTE — CONSULT LETTER
[FreeTextEntry2] : MIKAEL DAMON\par  [FreeTextEntry1] : \par \par Dear  Dr. MIKAEL DAMON,\par \par I had the pleasure of seeing your patient today.  \par Please see my note below.\par \par \par Thank you very much for allowing me to participate in the care of your patient.\par \par Sincerely,\par \par \par Sidney Ley MD\par [facility]\par [facility]\par  [facility]\par \par

## 2019-11-08 ENCOUNTER — APPOINTMENT (OUTPATIENT)
Dept: OTOLARYNGOLOGY | Facility: CLINIC | Age: 64
End: 2019-11-08

## 2019-11-18 ENCOUNTER — APPOINTMENT (OUTPATIENT)
Dept: OTOLARYNGOLOGY | Facility: CLINIC | Age: 64
End: 2019-11-18

## 2019-11-18 ENCOUNTER — APPOINTMENT (OUTPATIENT)
Dept: ORTHOPEDIC SURGERY | Facility: CLINIC | Age: 64
End: 2019-11-18
Payer: MEDICARE

## 2019-11-18 VITALS — HEIGHT: 65 IN | RESPIRATION RATE: 16 BRPM | BODY MASS INDEX: 42.49 KG/M2 | WEIGHT: 255 LBS

## 2019-11-18 DIAGNOSIS — S52.502A UNSPECIFIED FRACTURE OF THE LOWER END OF LEFT RADIUS, INITIAL ENCOUNTER FOR CLOSED FRACTURE: ICD-10-CM

## 2019-11-18 PROCEDURE — 99213 OFFICE O/P EST LOW 20 MIN: CPT

## 2019-11-18 PROCEDURE — 73110 X-RAY EXAM OF WRIST: CPT | Mod: LT

## 2019-11-20 ENCOUNTER — APPOINTMENT (OUTPATIENT)
Dept: OTOLARYNGOLOGY | Facility: CLINIC | Age: 64
End: 2019-11-20
Payer: MEDICARE

## 2019-11-20 VITALS
HEART RATE: 85 BPM | WEIGHT: 255 LBS | HEIGHT: 65 IN | BODY MASS INDEX: 42.49 KG/M2 | DIASTOLIC BLOOD PRESSURE: 55 MMHG | SYSTOLIC BLOOD PRESSURE: 113 MMHG

## 2019-11-20 DIAGNOSIS — R09.81 NASAL CONGESTION: ICD-10-CM

## 2019-11-20 PROCEDURE — 99213 OFFICE O/P EST LOW 20 MIN: CPT | Mod: 25

## 2019-11-20 PROCEDURE — 31237 NSL/SINS NDSC SURG BX POLYPC: CPT | Mod: 50

## 2019-11-20 NOTE — HISTORY OF PRESENT ILLNESS
[de-identified] : EDWIN JALLOH \jus Seen in followup on November 4. I had seen her a week ago with an acute otitis media and she comes in concerned about her sugars and because her hearing is not improved. The pain, however is better.\par \par She has a long history of ozena, chronic sinusitis, reflux and temporomandibular joint dysfunction. The patient had no other ear nose or throat complaints at this visit.  [FreeTextEntry1] : She was seen in followup on November 20 having had an acute left sided ear pain that was felt to be from her temporomandibular joint. She still notes a little bit of congestion in the left ear. She was told that she has crusting in the nasal cavities and she comes in for repeat evaluation.

## 2019-11-20 NOTE — ASSESSMENT
[FreeTextEntry1] : it was my impression that her acute otitis media had resolved in the left ear. She still has a slight negative pressure and I recommended continuing with nasal steroids.\par she has ozena and crusting of the left maxilla and this was debrided to clear again. I recommended continuing with nasal rinses\par Her ear pain has been from her temporomandibular joint and this was discussed. She does have a bruxism appliance which she has not been usually regularly and I recommended that she do so and followup further with her dentist as needed

## 2019-11-20 NOTE — PHYSICAL EXAM
[de-identified] : Nasal endoscopy: \par CPT 30480 Procedure Note:\par \par Nasal endoscopy was done with topical anesthesia of Pontocaine and Afrin and a      nasal endoscope.\par Indication: Nasal congestion, rule out sinusitis.\par Procedure: The nasal cavity was anesthetized with topical Afrin and Pontocaine. An  endoscope was used and inserted into the nasal cavity.\par Attention was first paid to the anterior nasal cavity.\par Endocoscopy was performed to inspect the interior of the nasal cavity, the nasal septum,  the middle and superior meati, the inferior, middle and superior turbinates, and the spheno-ethmoidal  recesses, the nasopharynx and eustachian tube orifices bilaterally. \par All findings were normal except:\par This showed that the sinuses were patent bilaterally and she Had mustard-colored crusting in the left middle meatus and the left maxillary sinus.  with further anesthesia attention was paid with a 30° pediatric endoscope. With curved suction the left antrum was suctioned and debrided to clear CPT 87591N did not recultureNasopharynx and eustachian tube orifices were benign\par \par \par She was able to insufflate on the left [FreeTextEntry1] : General:\par The patient was alert and oriented and in no distress.\par Voice was clear.\par She looked Better\par \par Oral cavity:\par The oral mucosa was normal.\par The oral and base of tongue were clear and without mass.\par The gingival and buccal mucosa were moist and without lesions.\par The palate moved well.\par There was no cleft to the palate.\par There appeared to be good salivary flow.  \par There was no pus, erythema or mass in the oral cavity.\par \par Neck: \par The neck was symmetrical.\par The parotid and submandibular glands were normal without masses.\par The trachea was midline and there was no unusual crepitus.\par The thyroid was smooth and nontender and no masses were palpated.\par There was no significant cervical adenopathy.\par She has slight tenderness of the submandibular glands\par \par Face:\par The patient had no facial asymmetry or mass.\par The skin was unremarkable.\par She has the orbital asymmetry\par \par Ears:\par The right ear canal was clear the right eardrum was intact and mobile.\par The left ear canal was Clear.  She had an area of sclerosis, but no persistent effusion and there was some retraction\par \par She had significant temporomandibular joint crepitus and tenderness and oral signs Of bruxism\par

## 2019-11-20 NOTE — CONSULT LETTER
[FreeTextEntry2] : MIKAEL DAMON\par  [FreeTextEntry1] : \par \par Dear  Dr. MIKAEL DAMON,\par \par I had the pleasure of seeing your patient today.  \par Please see my note below.\par \par \par Thank you very much for allowing me to participate in the care of your patient.\par \par Sincerely,\par \par \par Sidney Ley MD\par [facility]\par [facility]\par  [facility]\par \par

## 2019-11-20 NOTE — REASON FOR VISIT
[Subsequent Evaluation] : a subsequent evaluation for [Hearing Loss] : hearing loss [FreeTextEntry2] : otitis media

## 2019-11-20 NOTE — REVIEW OF SYSTEMS
[Negative] : Heme/Lymph [Hearing Loss] : hearing loss [Throat Clearing] : throat clearing [Chills] : chills [Feeling Poorly] : feeling poorly [Feeling Tired] : feeling tired [Joint Stiffness] : joint stiffness [Ear Pain] : no ear pain

## 2019-12-11 ENCOUNTER — APPOINTMENT (OUTPATIENT)
Dept: OTOLARYNGOLOGY | Facility: CLINIC | Age: 64
End: 2019-12-11
Payer: MEDICARE

## 2019-12-11 VITALS
WEIGHT: 255 LBS | DIASTOLIC BLOOD PRESSURE: 66 MMHG | BODY MASS INDEX: 42.49 KG/M2 | SYSTOLIC BLOOD PRESSURE: 122 MMHG | HEIGHT: 65 IN | HEART RATE: 85 BPM

## 2019-12-11 PROCEDURE — 31231 NASAL ENDOSCOPY DX: CPT

## 2019-12-11 PROCEDURE — 99213 OFFICE O/P EST LOW 20 MIN: CPT | Mod: 25

## 2019-12-11 NOTE — PHYSICAL EXAM
[FreeTextEntry1] : General:\par The patient was alert and oriented and in no distress.\par Voice was clear.\par She looked Better\par \par Oral cavity:\par The oral mucosa was normal.\par The oral and base of tongue were clear and without mass.\par The gingival and buccal mucosa were moist and without lesions.\par The palate moved well.\par There was no cleft to the palate.\par There appeared to be good salivary flow.  \par There was no pus, erythema or mass in the oral cavity.\par \par Neck: \par The neck was symmetrical.\par The parotid and submandibular glands were normal without masses.\par The trachea was midline and there was no unusual crepitus.\par The thyroid was smooth and nontender and no masses were palpated.\par There was no significant cervical adenopathy.\par She has slight tenderness of the submandibular glands\par \par Face:\par The patient had no facial asymmetry or mass.\par The skin was unremarkable.\par She has the orbital asymmetry\par \par Ears:\par The right ear canal was clear the right eardrum was intact and mobile.\par The left ear canal was Clear.  She had an area of sclerosis, but no persistent effusion and there was some retraction\par \par She had significant temporomandibular joint crepitus   and oral signs Of bruxism but the tenderness and cross bite were improved. \par  [de-identified] : Nasal endoscopy: \par CPT 85409\par Procedure Note:\par \par Nasal endoscopy was done with topical anesthesia of Pontocaine and Afrin and a      nasal endoscope.\par Indication: Nasal congestion, rule out sinusitis.\par Procedure: The nasal cavity was anesthetized with topical Afrin and Pontocaine. An  endoscope was used and inserted into the nasal cavity.\par Attention was first paid to the anterior nasal cavity.\par Endocoscopy was performed to inspect the interior of the nasal cavity, the nasal septum,  the middle and superior meati, the inferior, middle and superior turbinates, and the spheno-ethmoidal  recesses, the nasopharynx and eustachian tube orifices bilaterally. \par All findings were normal except:\par The nasal mucosa remained dry with moderate right septal deflection. However, the sinuses all were patent. There was no further purulent discharge in the left antrum.

## 2019-12-11 NOTE — ASSESSMENT
[FreeTextEntry1] : it was my impression that her acute otitis media had resolved in the left ear.\par she has ozena and Recurrent crusting of the left maxilla but this was improved today. I. again the recommended topical moisture rising and continuing with nasal rinses\par Her ear pain has been from her temporomandibular joint and this was discussed, but this was improved with wearing her oral appliance.I wanted her to keep using this.\par she benefits from repeat debridements of the crusting and I recommended doing that again in 4-6 weeks or as needed\par

## 2019-12-11 NOTE — REVIEW OF SYSTEMS
[Vertigo] : vertigo [Recurrent Sinus Infections] : recurrent sinus infections [Joint Pain] : joint pain [Negative] : Heme/Lymph [Hearing Loss] : hearing loss [Throat Clearing] : throat clearing [Chills] : chills [Feeling Poorly] : feeling poorly [Joint Stiffness] : joint stiffness [Feeling Tired] : feeling tired [Ear Pain] : no ear pain

## 2019-12-11 NOTE — REASON FOR VISIT
[Subsequent Evaluation] : a subsequent evaluation for [Sinusitis] : sinusitis [Hearing Loss] : hearing loss [FreeTextEntry2] : otitis media

## 2019-12-11 NOTE — HISTORY OF PRESENT ILLNESS
[FreeTextEntry1] : She was seen in followup on November 20 having had an acute left sided ear pain that was felt to be from her temporomandibular joint. She still notes a little bit of congestion in the left ear. She was told that she has crusting in the nasal cavities and she comes in for repeat evaluation.\par \par December 11-  she was seen in followup today for repeat evaluation. She notes that she is doing much better and other than nasal dryness her symptoms have improved.  [de-identified] : EDWIN JALLOH \jus Seen in followup on November 4. I had seen her a week ago with an acute otitis media and she comes in concerned about her sugars and because her hearing is not improved. The pain, however is better.\par \par She has a long history of ozena, chronic sinusitis, reflux and temporomandibular joint dysfunction. The patient had no other ear nose or throat complaints at this visit.

## 2019-12-11 NOTE — CONSULT LETTER
[FreeTextEntry2] : MIKAEL DAMON\par  [FreeTextEntry1] : \par \par Dear  Dr. MIKAEL DAMON,\par \par I had the pleasure of seeing your patient today.  \par Please see my note below.\par \par \par Thank you very much for allowing me to participate in the care of your patient.\par \par Sincerely,\par \par \par Sidney Ley MD\par [facility]\par [facility]\par  [facility]\par \par

## 2019-12-16 ENCOUNTER — APPOINTMENT (OUTPATIENT)
Dept: ORTHOPEDIC SURGERY | Facility: CLINIC | Age: 64
End: 2019-12-16

## 2020-01-08 ENCOUNTER — APPOINTMENT (OUTPATIENT)
Dept: OTOLARYNGOLOGY | Facility: CLINIC | Age: 65
End: 2020-01-08
Payer: MEDICARE

## 2020-01-08 VITALS
HEIGHT: 65 IN | WEIGHT: 255 LBS | BODY MASS INDEX: 42.49 KG/M2 | DIASTOLIC BLOOD PRESSURE: 67 MMHG | SYSTOLIC BLOOD PRESSURE: 119 MMHG | HEART RATE: 89 BPM

## 2020-01-08 PROCEDURE — 31231 NASAL ENDOSCOPY DX: CPT

## 2020-01-08 PROCEDURE — 99213 OFFICE O/P EST LOW 20 MIN: CPT | Mod: 25

## 2020-01-08 NOTE — PHYSICAL EXAM
[FreeTextEntry1] : General:\par The patient was alert and oriented and in no distress.\par Voice was clear.\par She looked Better\par \par Oral cavity:\par The oral mucosa was normal.\par The oral and base of tongue were clear and without mass.\par The gingival and buccal mucosa were moist and without lesions.\par The palate moved well.\par There was no cleft to the palate.\par There appeared to be good salivary flow.  \par There was no pus, erythema or mass in the oral cavity.\par \par Neck: \par The neck was symmetrical.\par The parotid and submandibular glands were normal without masses.\par The trachea was midline and there was no unusual crepitus.\par The thyroid was smooth and nontender and no masses were palpated.\par There was no significant cervical adenopathy.\par She has slight tenderness of the submandibular glands\par \par Face:\par The patient had no facial asymmetry or mass.\par The skin was unremarkable.\par She has the orbital asymmetry\par \par Ears:\par The right ear canal was clear the right eardrum was intact and mobile.\par The left ear canal was Clear.  She had an area of sclerosis, but no persistent effusion and there was some retraction\par \par She had significant temporomandibular joint crepitus   and oral signs Of bruxism but the tenderness and cross bite were improved. \par  [de-identified] : Nasal endoscopy: \par CPT 05363\par Procedure Note:\par \par Nasal endoscopy was done with topical anesthesia of Pontocaine and Afrin and a      nasal endoscope.\par Indication: Nasal congestion, rule out sinusitis.\par Procedure: The nasal cavity was anesthetized with topical Afrin and Pontocaine. An  endoscope was used and inserted into the nasal cavity.\par Attention was first paid to the anterior nasal cavity.\par Endocoscopy was performed to inspect the interior of the nasal cavity, the nasal septum,  the middle and superior meati, the inferior, middle and superior turbinates, and the spheno-ethmoidal  recesses, the nasopharynx and eustachian tube orifices bilaterally. \par All findings were normal except:\par The nasal mucosa remained dry with moderate right septal deflection. However, the sinuses all were patent. There was no further purulent discharge in the left antrum.

## 2020-01-08 NOTE — ASSESSMENT
[FreeTextEntry1] : it was my impression that her acute otitis media had resolved in the left ear.\par she has ozena and Recurrent crusting of the left maxilla but this was improved today. I. again the recommended topical moisturising and continuing with nasal rinses\par Her otalgia has resolved as well and some of that was likely from her TMJ which is improved.\par She benefits from repeat debridements of the crusting and I recommended doing that again in 4-6 weeks or as needed\par

## 2020-01-08 NOTE — REASON FOR VISIT
[Subsequent Evaluation] : a subsequent evaluation for [Hearing Loss] : hearing loss [Sinusitis] : sinusitis [FreeTextEntry2] : otitis media

## 2020-01-08 NOTE — REVIEW OF SYSTEMS
[Negative] : Heme/Lymph [Hearing Loss] : hearing loss [Vertigo] : vertigo [Recurrent Sinus Infections] : recurrent sinus infections [Throat Clearing] : throat clearing [Chills] : chills [Feeling Poorly] : feeling poorly [Feeling Tired] : feeling tired [Joint Pain] : joint pain [Joint Stiffness] : joint stiffness [Ear Pain] : no ear pain

## 2020-01-08 NOTE — CONSULT LETTER
[FreeTextEntry2] : MIKAEL DAMON\par  [FreeTextEntry1] : \par \par Dear  Dr. MIKAEL DAMON,\par \par I had the pleasure of seeing your patient today.  \par Please see my note below.\par \par \par Thank you very much for allowing me to participate in the care of your patient.\par \par Sincerely,\par \par \par Sidney Ley MD\par [facility]\par [facility]\par  [facility]\par \par

## 2020-01-09 ENCOUNTER — APPOINTMENT (OUTPATIENT)
Dept: ORTHOPEDIC SURGERY | Facility: CLINIC | Age: 65
End: 2020-01-09
Payer: MEDICARE

## 2020-01-09 VITALS — RESPIRATION RATE: 16 BRPM | WEIGHT: 255 LBS | HEIGHT: 65 IN | BODY MASS INDEX: 42.49 KG/M2

## 2020-01-09 DIAGNOSIS — M25.532 PAIN IN LEFT WRIST: ICD-10-CM

## 2020-01-09 PROCEDURE — 73110 X-RAY EXAM OF WRIST: CPT | Mod: LT

## 2020-01-09 PROCEDURE — 99214 OFFICE O/P EST MOD 30 MIN: CPT

## 2020-02-19 ENCOUNTER — APPOINTMENT (OUTPATIENT)
Dept: OTOLARYNGOLOGY | Facility: CLINIC | Age: 65
End: 2020-02-19
Payer: MEDICARE

## 2020-02-19 VITALS
DIASTOLIC BLOOD PRESSURE: 64 MMHG | HEIGHT: 65 IN | WEIGHT: 255 LBS | SYSTOLIC BLOOD PRESSURE: 108 MMHG | BODY MASS INDEX: 42.49 KG/M2 | HEART RATE: 90 BPM

## 2020-02-19 PROCEDURE — 31231 NASAL ENDOSCOPY DX: CPT

## 2020-02-19 PROCEDURE — 99213 OFFICE O/P EST LOW 20 MIN: CPT | Mod: 25

## 2020-02-19 NOTE — ASSESSMENT
[FreeTextEntry1] : It was my impression that she has the crusting in the dry nasal mucosa that was debrided. I recommended mupirocin ointment to the nasal cavity and getting E. humidifier. I did not see evidence of a sinus infection. 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

## 2020-02-19 NOTE — REVIEW OF SYSTEMS
[Recurrent Sinus Infections] : recurrent sinus infections [Nose Bleeds] : nose bleeds [Negative] : Heme/Lymph [Ear Pain] : no ear pain [Hearing Loss] : hearing loss [Throat Clearing] : throat clearing [Vertigo] : vertigo [Chills] : chills [Feeling Poorly] : feeling poorly [Feeling Tired] : feeling tired [Joint Pain] : joint pain [Joint Stiffness] : joint stiffness

## 2020-02-19 NOTE — PHYSICAL EXAM
[FreeTextEntry1] : General:\par The patient was alert and oriented and in no distress.\par Voice was clear.\par She looked Better\par \par Oral cavity:\par The oral mucosa was normal.\par The oral and base of tongue were clear and without mass.\par The gingival and buccal mucosa were moist and without lesions.\par The palate moved well.\par There was no cleft to the palate.\par There appeared to be good salivary flow.  \par There was no pus, erythema or mass in the oral cavity.\par \par Neck: \par The neck was symmetrical.\par The parotid and submandibular glands were normal without masses.\par The trachea was midline and there was no unusual crepitus.\par The thyroid was smooth and nontender and no masses were palpated.\par There was no significant cervical adenopathy.\par She has slight tenderness of the submandibular glands\par \par Face:\par The patient had no facial asymmetry or mass.\par The skin was unremarkable.\par She has the orbital asymmetry\par \par Ears:\par The right ear canal was clear the right eardrum was intact and mobile.\par The left ear canal was Clear.  She had an area of sclerosis, but no persistent effusion and there was some retraction\par \par She had significant temporomandibular joint crepitus   and oral signs Of bruxism but the tenderness and cross bite were improved. \par  [de-identified] : Nasal endoscopy: \par CPT 09047\par Procedure Note:\par \par Nasal endoscopy was done with topical anesthesia of Pontocaine and Afrin and a      nasal endoscope.\par Indication: Nasal congestion, rule out sinusitis.\par Procedure: The nasal cavity was anesthetized with topical Afrin and Pontocaine. An  endoscope was used and inserted into the nasal cavity.\par Attention was first paid to the anterior nasal cavity.\par Endocoscopy was performed to inspect the interior of the nasal cavity, the nasal septum,  the middle and superior meati, the inferior, middle and superior turbinates, and the spheno-ethmoidal  recesses, the nasopharynx and eustachian tube orifices bilaterally. \par All findings were normal except:\par There was significant dry crusting on both sides of the septum the needed mucosa in an area of recent eschar on the right. However, I was able to answer all of her sinuses and there was no evidence of sinus infection or purulence.\par Switching to a rigid a 0° endoscope both sides were debrided.\par \par She has tenderness of the right maxilla.

## 2020-02-19 NOTE — CONSULT LETTER
[FreeTextEntry2] : MIKAEL DAMON\par  [FreeTextEntry1] : \par \par Dear  Dr. MIKAEL DAMON,\par \par I had the pleasure of seeing your patient today.  \par Please see my note below.\par \par \par Thank you very much for allowing me to participate in the care of your patient.\par \par Sincerely,\par \par \par Sdiney Ley MD\par [facility]\par [facility]\par  [facility]\par \par

## 2020-02-19 NOTE — HISTORY OF PRESENT ILLNESS
[de-identified] : EDWIN JALLOH Was seen on February 19. She comes in complaining of several days of right facial pain and she has had right-sided epistaxis. Her nasal airway has been quite dry with crusting. She is concerned that she may have a sinus infection. She has a history of ozena and nasal crusting that could obstruct her sinuses in course and sinusitis when not cleared.  The patient had no other ear nose or throat complaints at this visit.

## 2020-03-11 ENCOUNTER — APPOINTMENT (OUTPATIENT)
Dept: OTOLARYNGOLOGY | Facility: CLINIC | Age: 65
End: 2020-03-11
Payer: MEDICARE

## 2020-03-11 VITALS — HEIGHT: 65 IN | BODY MASS INDEX: 42.49 KG/M2 | WEIGHT: 255 LBS

## 2020-03-11 VITALS
SYSTOLIC BLOOD PRESSURE: 127 MMHG | HEIGHT: 65 IN | DIASTOLIC BLOOD PRESSURE: 79 MMHG | HEART RATE: 84 BPM | BODY MASS INDEX: 42.49 KG/M2 | WEIGHT: 255 LBS

## 2020-03-11 PROCEDURE — 99213 OFFICE O/P EST LOW 20 MIN: CPT | Mod: 25

## 2020-03-11 PROCEDURE — 31231 NASAL ENDOSCOPY DX: CPT

## 2020-03-11 NOTE — ASSESSMENT
[FreeTextEntry1] : It was my impression that she was improved. She was having no further bleeding and discomfort had resolved.\par The nasal mucosa in the sinus mucosa is also improved and I recommended continuing with topical moisturizing.\par Her temporomandibular joint is also improved\par At this point I recommended repeat evaluation in 6 weeks or as needed. In the past, she has required recurrent debridements for her ozena

## 2020-03-11 NOTE — PHYSICAL EXAM
[FreeTextEntry1] : \par The patient was alert and oriented and in no distress.\par Voice was clear.\par \par Face:\par The patient had no facial asymmetry or mass.\par The skin was unremarkable.\par \par Eyes:\par The pupils were equal round and reactive to light and accommodation.\par She had slight orbital asymmetry.\par \par Nose: \par The external nose had no significant deformity.  There was no facial tenderness.  On anterior rhinoscopy, the nasal mucosa was clear.\par \par \par Oral cavity:\par The oral mucosa was normal.\par The oral and base of tongue were clear and without mass.\par The gingival and buccal mucosa were moist and without lesions.\par The palate moved well.\par There was no cleft to the palate.\par There appeared to be good salivary flow.  \par There was no pus, erythema or mass in the oral cavity.\par \par \par Ears:\par The external ears were normal without deformity.\par The ear canals were clear.\par The tympanic membranes were intact and normal.\par \par Neck: \par The neck was symmetrical.\par The parotid and submandibular glands were normal without masses.\par The trachea was midline and there was no unusual crepitus.\par The thyroid was smooth and nontender and no masses were palpated.\par There was no significant cervical adenopathy.\par \par \par Neuro:\par Neurologically, the patient was awake, alert, and oriented to person, place and time. There were no obvious focal neurologic abnormalities.  Cranial nerves II through XII were grossly intact.\par \par \par TMJ:\par The temporomandibular joints were moderately tender and with crepitus, but improved.\par  [de-identified] : Nasal endoscopy: \par CPT 38182\par Procedure Note:\par \par Nasal endoscopy was done with topical anesthesia of Pontocaine and Afrin and a      nasal endoscope.\par Indication: Ozena, epistaxis, evaluation of response to therapy\par Procedure: The nasal cavity was anesthetized with topical Afrin and Pontocaine. An  endoscope was used and inserted into the nasal cavity.\par Attention was first paid to the anterior nasal cavity.\par Endocoscopy was performed to inspect the interior of the nasal cavity, the nasal septum,  the middle and superior meati, the inferior, middle and superior turbinates, and the spheno-ethmoidal  recesses, the nasopharynx and eustachian tube orifices bilaterally. \par All findings were normal except:\par \par This showed that the nasal mucosa was improved. She had a small eschar on the right septal prominence but without evidence of recent bleeding.\par The right middle meatus was open and surgically open sinuses looked quite good.\par On the left, there was no persistent crusting in the antrum with good without evidence of recurrent disease\par

## 2020-03-11 NOTE — HISTORY OF PRESENT ILLNESS
[de-identified] : EDWIN JALLOH Was seen in followup on March 11. I had seen her 3 weeks ago with right-sided facial pain and right-sided epistaxis, treated with mupiricin.  She comes in for repeat evaluation. She is feeling much better and has not had further bleeding or pain.\par Her jaw was bothering her a bit yesterday, but not today.\par She has a long history of ozena requiring debridements, chronic sinusitis, reflux and temporomandibular joint dysfunction. The patient had no other ear nose or throat complaints at this visit

## 2020-03-11 NOTE — CONSULT LETTER
[FreeTextEntry2] : MIKAEL DAMON\par  [FreeTextEntry1] : \par \par Dear  Dr. MIKAEL DAMON,\par \par I had the pleasure of seeing your patient today.  \par Please see my note below.\par \par \par Thank you very much for allowing me to participate in the care of your patient.\par \par Sincerely,\par \par \par Sidney Ley MD\par [facility]\par [facility]\par  [facility]\par \par

## 2020-04-15 ENCOUNTER — RX RENEWAL (OUTPATIENT)
Age: 65
End: 2020-04-15

## 2020-04-20 ENCOUNTER — APPOINTMENT (OUTPATIENT)
Dept: OTOLARYNGOLOGY | Facility: CLINIC | Age: 65
End: 2020-04-20

## 2020-06-12 ENCOUNTER — APPOINTMENT (OUTPATIENT)
Dept: OTOLARYNGOLOGY | Facility: CLINIC | Age: 65
End: 2020-06-12
Payer: MEDICARE

## 2020-06-12 VITALS — BODY MASS INDEX: 42.49 KG/M2 | HEIGHT: 65 IN | WEIGHT: 255 LBS | TEMPERATURE: 97.8 F

## 2020-06-12 PROCEDURE — 99214 OFFICE O/P EST MOD 30 MIN: CPT | Mod: 25

## 2020-06-12 PROCEDURE — 31231 NASAL ENDOSCOPY DX: CPT

## 2020-06-12 NOTE — REVIEW OF SYSTEMS
[Sinus Pain] : sinus pain [Discolored Nasal Discharge] : discolored nasal discharge [Negative] : Endocrine

## 2020-06-18 LAB — BACTERIA FLD CULT: ABNORMAL

## 2020-06-18 NOTE — PHYSICAL EXAM
[de-identified] : Nasal endoscopy: \par CPT 91347\par Procedure Note:\par \par Endoscopy was done with Covid precautions and with video. All risks and benefits were discussed with the patient and consent obtained.\par \par Nasal endoscopy was done with topical anesthesia of Pontocaine and Afrin and a      nasal endoscope.\par Indication: Nasal congestion, rule out sinusitis.\par Procedure: The nasal cavity was anesthetized with topical Afrin and Pontocaine. An  endoscope was used and inserted into the nasal cavity.\par Attention was first paid to the anterior nasal cavity.\par Endocoscopy was performed to inspect the interior of the nasal cavity, the nasal septum,  the middle and superior meati, the inferior, middle and superior turbinates, and the spheno-ethmoidal  recesses, the nasopharynx and eustachian tube orifices bilaterally. \par All findings were normal except:\par \par The mucosa was beefy and dry  with crusting. The right maxillary sinus was patent and there was a small amount of yellow but moist the discharge coming from the left antrum. With the rigid 0° endoscope this was suctioned and cultured. [FreeTextEntry1] : General:\par The patient was alert and oriented and in no distress.\par Voice was clear.\par She has a disconjugate gaze\par \par Ears:\par The external ears were normal without deformity.\par The ear canals were clear.\par The tympanic membranes were intact and normal.\par \par Oral cavity:\par The oral mucosa was normal.\par The oral and base of tongue were clear and without mass.\par The gingival and buccal mucosa were moist and without lesions.\par The palate moved well.\par There was no cleft to the palate.\par There appeared to be good salivary flow.  \par There was no pus, erythema or mass in the oral cavity.\par \par Neck: \par The neck was symmetrical.\par The parotid and submandibular glands were normal without masses.\par The trachea was midline and there was no unusual crepitus.\par The thyroid was smooth and nontender and no masses were palpated.\par There was no significant cervical adenopathy.\par \par Neuro:\par Neurologically, the patient was awake, alert, and oriented to person, place and time. There were no obvious focal neurologic abnormalities.  Cranial nerves II through XII were grossly intact.\par \par Face:\par The patient had no facial asymmetry or mass.\par The skin was unremarkable.\par \par Nose: \par The external nose had no significant deformity.  There was no facial tenderness.

## 2020-06-18 NOTE — ASSESSMENT
[FreeTextEntry1] : It was my impression that she has an exacerbation in probable sinusitis. In the past, she has done well with cefuroxime and Diflucan for yeast infections. Pending the culture results I recommended a course of cefuroxime and Diflucan. I suggested continuing with Bactroban rinses and would change antibiotics as indicated by her culture. Otherwise, I would like to see her back in followup in 3-4 weeks to make sure that this has resolved.\par \par More than 25 minutes was spent with the patient reviewing options, medical records and counseling about care, face to face.

## 2020-06-18 NOTE — HISTORY OF PRESENT ILLNESS
[de-identified] : EDWIN JALLOH Was seen on June 12. I had last seen her in March. She comes in complaining of several days of a green purulent discharge, primarily from the left nasal cavity that is also irritating her throat.\par She has not been febrile or at evidence of Corona virus infection or positive testing.\par She has a long history of ozena requiring debridements, chronic sinusitis, reflux and temporomandibular joint dysfunction. The patient had no other ear nose or throat complaints at this visit

## 2020-06-18 NOTE — CONSULT LETTER
[FreeTextEntry1] : \par \par Dear  Dr. MIKAEL DAMON,\par \par I had the pleasure of seeing your patient today.  \par Please see my note below.\par \par \par Thank you very much for allowing me to participate in the care of your patient.\par \par Sincerely,\par \par \par Sidney Ley MD\par [facility]\par [facility]\par  [facility]\par \par  [FreeTextEntry2] : MIKAEL DAMON\par

## 2020-07-08 ENCOUNTER — APPOINTMENT (OUTPATIENT)
Dept: OTOLARYNGOLOGY | Facility: CLINIC | Age: 65
End: 2020-07-08
Payer: MEDICARE

## 2020-07-08 VITALS — WEIGHT: 255 LBS | HEIGHT: 65 IN | TEMPERATURE: 96.8 F | BODY MASS INDEX: 42.49 KG/M2

## 2020-07-08 PROCEDURE — 99213 OFFICE O/P EST LOW 20 MIN: CPT | Mod: 25

## 2020-07-08 PROCEDURE — 31231 NASAL ENDOSCOPY DX: CPT

## 2020-07-08 NOTE — HISTORY OF PRESENT ILLNESS
[de-identified] : \par EDWIN JALLOH Was seen in followup on July 8. She was seen previously with a purulent sinusitis, staph aureus on culture that was methicillin sensitive. She is doing better and having no further purulence and I asked her to come in for repeat evaluation to make sure that this has responded. She still notes some crusting.\par She has not been febrile or at evidence of Corona virus infection or positive testing.\par She has a long history of ozena requiring debridements, chronic sinusitis, reflux and temporomandibular joint dysfunction. The patient had no other ear nose or throat complaints at this visi

## 2020-07-08 NOTE — PHYSICAL EXAM
[FreeTextEntry1] : General:\par The patient was alert and oriented and in no distress.\par Voice was clear.\par She has a disconjugate gaze\par \par Ears:\par The external ears were normal without deformity.\par The ear canals were clear.\par The tympanic membranes were intact and normal.\par \par Oral cavity:\par The oral mucosa was normal.\par The oral and base of tongue were clear and without mass.\par The gingival and buccal mucosa were moist and without lesions.\par The palate moved well.\par There was no cleft to the palate.\par There appeared to be good salivary flow.  \par There was no pus, erythema or mass in the oral cavity.\par \par Neck: \par The neck was symmetrical.\par The parotid and submandibular glands were normal without masses.\par The trachea was midline and there was no unusual crepitus.\par The thyroid was smooth and nontender and no masses were palpated.\par There was no significant cervical adenopathy.\par \par Neuro:\par Neurologically, the patient was awake, alert, and oriented to person, place and time. There were no obvious focal neurologic abnormalities.  Cranial nerves II through XII were grossly intact.\par \par Face:\par The patient had no facial asymmetry or mass.\par The skin was unremarkable.\par \par Nose: \par The external nose had no significant deformity.  There was no facial tenderness.  [de-identified] : Nasal endoscopy:\par \par Nasal endoscopy was done with topical anesthesia and a flexible endoscope to evaluate for nasal polyps, chronic sinusitis and response to therapy.\par Endocoscopy was performed to inspect the interior of the nasal cavity, the nasal septum,  the middle and superior meati, the inferior, middle and superior turbinates, and the spheno-ethmoidal  recesses, the nasopharynx and eustachian tube orifices bilaterally\par \par Endoscopy was done with Covid precautions and with video. All risks and benefits were discussed with the patient and consent obtained.\par \par \par Evaluation showed that the septum was midline.  There was no significant mucosal disease.  The inferior turbinates and middle turbinates were normal.  On both sides, the surgically opened ethmoids, antra, and frontal recesses were clear.  There was no evidence of polypoid recurrences and no purulence.  The mucosa was beefy throughout but there was no evidence of crusting on either side. The purulent sinusitis had resolved as well

## 2020-07-08 NOTE — CONSULT LETTER
[FreeTextEntry2] : MIKAEL DAMON\par  [FreeTextEntry1] : \par \par Dear  Dr. MIKAEL DAMON,\par \par I had the pleasure of seeing your patient today.  \par Please see my note below.\par \par \par Thank you very much for allowing me to participate in the care of your patient.\par \par Sincerely,\par \par \par Sidney Ley MD\par [facility]\par [facility]\par  [facility]\par \par

## 2020-07-08 NOTE — ASSESSMENT
[FreeTextEntry1] : It was my impression that she had had a methicillin sensitive staph aureus sinusitis has exacerbation of her chronic disease. This responded quite well and was sensitive to be cefuroxime and there is no evidence of persistent infection at this time. I suggested continuing on her current regimen and will see her for repeat debridement.

## 2020-09-24 DIAGNOSIS — M79.672 PAIN IN LEFT FOOT: ICD-10-CM

## 2020-09-25 ENCOUNTER — APPOINTMENT (OUTPATIENT)
Dept: ORTHOPEDIC SURGERY | Facility: CLINIC | Age: 65
End: 2020-09-25
Payer: MEDICARE

## 2020-09-25 ENCOUNTER — TRANSCRIPTION ENCOUNTER (OUTPATIENT)
Age: 65
End: 2020-09-25

## 2020-09-25 VITALS — WEIGHT: 255 LBS | RESPIRATION RATE: 16 BRPM | BODY MASS INDEX: 42.49 KG/M2 | HEIGHT: 65 IN

## 2020-09-25 DIAGNOSIS — M79.674 PAIN IN RIGHT TOE(S): ICD-10-CM

## 2020-09-25 DIAGNOSIS — M21.42 FLAT FOOT [PES PLANUS] (ACQUIRED), LEFT FOOT: ICD-10-CM

## 2020-09-25 DIAGNOSIS — M21.41 FLAT FOOT [PES PLANUS] (ACQUIRED), RIGHT FOOT: ICD-10-CM

## 2020-09-25 DIAGNOSIS — S99.921A UNSPECIFIED INJURY OF RIGHT FOOT, INITIAL ENCOUNTER: ICD-10-CM

## 2020-09-25 PROCEDURE — 73610 X-RAY EXAM OF ANKLE: CPT | Mod: 50

## 2020-09-25 PROCEDURE — 99214 OFFICE O/P EST MOD 30 MIN: CPT

## 2020-09-25 PROCEDURE — 73630 X-RAY EXAM OF FOOT: CPT | Mod: 50

## 2020-10-06 ENCOUNTER — APPOINTMENT (OUTPATIENT)
Dept: OTOLARYNGOLOGY | Facility: CLINIC | Age: 65
End: 2020-10-06
Payer: MEDICARE

## 2020-10-06 VITALS — TEMPERATURE: 95.4 F | HEIGHT: 65 IN | WEIGHT: 255 LBS | BODY MASS INDEX: 42.49 KG/M2

## 2020-10-06 PROCEDURE — 99214 OFFICE O/P EST MOD 30 MIN: CPT | Mod: 25

## 2020-10-06 PROCEDURE — 31237 NSL/SINS NDSC SURG BX POLYPC: CPT

## 2020-10-09 ENCOUNTER — APPOINTMENT (OUTPATIENT)
Dept: ORTHOPEDIC SURGERY | Facility: CLINIC | Age: 65
End: 2020-10-09
Payer: MEDICARE

## 2020-10-09 VITALS — BODY MASS INDEX: 42.49 KG/M2 | WEIGHT: 255 LBS | RESPIRATION RATE: 16 BRPM | HEIGHT: 65 IN

## 2020-10-09 DIAGNOSIS — S92.344A NONDISPLACED FRACTURE OF FOURTH METATARSAL BONE, RIGHT FOOT, INITIAL ENCOUNTER FOR CLOSED FRACTURE: ICD-10-CM

## 2020-10-09 DIAGNOSIS — S92.334A NONDISPLACED FRACTURE OF THIRD METATARSAL BONE, RIGHT FOOT, INITIAL ENCOUNTER FOR CLOSED FRACTURE: ICD-10-CM

## 2020-10-09 DIAGNOSIS — M84.374A STRESS FRACTURE, RIGHT FOOT, INITIAL ENCOUNTER FOR FRACTURE: ICD-10-CM

## 2020-10-09 DIAGNOSIS — S92.354A NONDISPLACED FRACTURE OF FIFTH METATARSAL BONE, RIGHT FOOT, INITIAL ENCOUNTER FOR CLOSED FRACTURE: ICD-10-CM

## 2020-10-09 PROCEDURE — 28470 CLTX METATARSAL FX WO MNP EA: CPT | Mod: 59,RT

## 2020-10-09 PROCEDURE — 99214 OFFICE O/P EST MOD 30 MIN: CPT | Mod: 57

## 2020-10-12 LAB — BACTERIA FLD CULT: ABNORMAL

## 2020-10-12 NOTE — ASSESSMENT
[FreeTextEntry1] : It was my impression that again she had marked crusting with purulent discharge from the left antrum and going into the left middle meatus.\par This was meticulously debrided and cultured.\par At this point, we decided to off on antibiotics she was feeling ill and await for the culture results. Hopefully removing the crusting we will allow for drainage and she may respond to irrigation.\par I will see her back in followup in 2-3 weeks to make sure there was no persistence or recurrence and I asked her to call me in the next 2-3 days to let me know how she is doing.

## 2020-10-12 NOTE — ADDENDUM
[FreeTextEntry1] : 10/12/20  culture MSSA-  would add abx if not better at next visit.  NB allergy to ceclor, cipro.   RLP

## 2020-10-12 NOTE — PHYSICAL EXAM
[FreeTextEntry1] : General:\par The patient was alert and oriented and in no distress.\par Voice was clear.\par She has a disconjugate gaze\par \par Ears:\par The external ears were normal without deformity.\par The ear canals were clear.\par The tympanic membranes were intact and normal.\par \par Oral cavity:\par The oral mucosa was normal.\par The oral and base of tongue were clear and without mass.\par The gingival and buccal mucosa were moist and without lesions.\par The palate moved well.\par There was no cleft to the palate.\par There appeared to be good salivary flow.  \par There was no pus, erythema or mass in the oral cavity.\par \par Neck: \par The neck was symmetrical.\par The parotid and submandibular glands were normal without masses.\par The trachea was midline and there was no unusual crepitus.\par The thyroid was smooth and nontender and no masses were palpated.\par There was no significant cervical adenopathy.\par \par Neuro:\par Neurologically, the patient was awake, alert, and oriented to person, place and time. There were no obvious focal neurologic abnormalities.  Cranial nerves II through XII were grossly intact.\par \par Face:\par The patient had no facial asymmetry or mass.\par The skin was unremarkable.\par \par Nose: \par The external nose had no significant deformity.  There was no facial tenderness.  [de-identified] : Nasal endoscopy: \par CPT 45549\par Procedure Note:\par \par Endoscopy was done with Covid precautions and with video. All risks and benefits were discussed with the patient and consent obtained.\par \par Nasal endoscopy was done with topical anesthesia of Pontocaine and Afrin and a      nasal endoscope.\par Indication: Nasal congestion, rule out sinusitis.\par Procedure: The nasal cavity was anesthetized with topical Afrin and Pontocaine. An  endoscope was used and inserted into the nasal cavity.\par Attention was first paid to the anterior nasal cavity.\par Endocoscopy was performed to inspect the interior of the nasal cavity, the nasal septum,  the middle and superior meati, the inferior, middle and superior turbinates, and the spheno-ethmoidal  recesses, the nasopharynx and eustachian tube orifices bilaterally. \par All findings were normal except:\par On the right side, the mucosa was flat but the sinuses were patent.\par On the left, the mucosa was dry and beefy.  The left middle meatus was filled with green crusting.  With further anesthesia and a straight and curved suction and forward biting forceps the antrum, which was filled with pus and crusting was suctioned and debrided to clear and cultured.\par \par

## 2020-10-12 NOTE — HISTORY OF PRESENT ILLNESS
[de-identified] : \par EDWIN JALLOH Was seen in followup on October 6th. \par She was doing well, but wanted to be evaluated to see if she were developing crusting or a repeat infection. \par She has not been febrile or at evidence of Corona virus infection or positive testing.\par She has a long history of ozena requiring debridements, chronic sinusitis, reflux and temporomandibular joint dysfunction. The patient had no other ear nose or throat complaints at this visit

## 2020-10-23 ENCOUNTER — APPOINTMENT (OUTPATIENT)
Dept: OTOLARYNGOLOGY | Facility: CLINIC | Age: 65
End: 2020-10-23
Payer: MEDICARE

## 2020-10-23 VITALS — TEMPERATURE: 97.5 F | BODY MASS INDEX: 42.49 KG/M2 | WEIGHT: 255 LBS | HEIGHT: 65 IN

## 2020-10-23 PROCEDURE — 99213 OFFICE O/P EST LOW 20 MIN: CPT | Mod: 25

## 2020-10-23 PROCEDURE — 31231 NASAL ENDOSCOPY DX: CPT

## 2020-10-23 NOTE — HISTORY OF PRESENT ILLNESS
[de-identified] : EDWIN JALLOH Was seen in followup on October 23. Her culture was negative so no sensitive staph but we decided to treat just with cleaning. She was feeling much better but today noticed a little bit of odor.The patient had no other ear nose or throat complaints at this visit.

## 2020-10-23 NOTE — PHYSICAL EXAM
[FreeTextEntry1] : General:\par The patient was alert and oriented and in no distress.\par Voice was clear.\par She has a disconjugate gaze\par \par Ears:\par The external ears were normal without deformity.\par The ear canals were clear.\par The tympanic membranes were intact and normal.\par \par Oral cavity:\par The oral mucosa was normal.\par The oral and base of tongue were clear and without mass.\par The gingival and buccal mucosa were moist and without lesions.\par The palate moved well.\par There was no cleft to the palate.\par There appeared to be good salivary flow.  \par There was no pus, erythema or mass in the oral cavity.\par \par Neck: \par The neck was symmetrical.\par The parotid and submandibular glands were normal without masses.\par The trachea was midline and there was no unusual crepitus.\par The thyroid was smooth and nontender and no masses were palpated.\par There was no significant cervical adenopathy.\par \par Neuro:\par Neurologically, the patient was awake, alert, and oriented to person, place and time. There were no obvious focal neurologic abnormalities.  Cranial nerves II through XII were grossly intact.\par \par Face:\par The patient had no facial asymmetry or mass.\par The skin was unremarkable.\par \par Nose: \par The external nose had no significant deformity.  There was no facial tenderness.  [de-identified] : Nasal endoscopy: \par CPT 64499\par Procedure Note:\par \par Endoscopy was done with Covid precautions and with video. All risks and benefits were discussed with the patient and consent obtained.\par \par Nasal endoscopy was done with topical anesthesia of Pontocaine and Afrin and a      nasal endoscope.\par Indication: Nasal congestion, rule out sinusitis.\par Procedure: The nasal cavity was anesthetized with topical Afrin and Pontocaine. An  endoscope was used and inserted into the nasal cavity.\par Attention was first paid to the anterior nasal cavity.\par Endocoscopy was performed to inspect the interior of the nasal cavity, the nasal septum,  the middle and superior meati, the inferior, middle and superior turbinates, and the spheno-ethmoidal  recesses, the nasopharynx and eustachian tube orifices bilaterally. \par All findings were normal except:\par \par She had the flat dry nasal mucosa. The right middle meatus was opened. The left had a small amount of crusting on the septum but visualization of the left antrum 360° she showed no further purulence or evidence of infection.

## 2020-10-23 NOTE — ASSESSMENT
[FreeTextEntry1] : It was my impression that clinically and on her exam, her symptoms were much much better just from the previous debridement and without adding an antibiotic. She did have a little bit of malodorous this morning but I did not see any purulence or significant discharge and reassured her period I explained could be infection starting again but I did not see one and at this point I just recommend continuing on her current regimen. I would add an antibiotic to cover methicillin sensitive staph if the purulence recurs

## 2020-10-23 NOTE — CONSULT LETTER
[FreeTextEntry2] : MIKAEL DAMON\par  [FreeTextEntry1] : \par \par Dear  Dr. MIKAEL DAMON,\par \par I had the pleasure of seeing your patient today.  \par Please see my note below.\par \par \par Thank you very much for allowing me to participate in the care of your patient.\par \par Sincerely,\par \par \par Sidney Ley MD\par [facility]\par [facility]\par  [facility]\par \par

## 2020-11-13 ENCOUNTER — APPOINTMENT (OUTPATIENT)
Dept: OTOLARYNGOLOGY | Facility: CLINIC | Age: 65
End: 2020-11-13
Payer: MEDICARE

## 2020-11-13 VITALS — TEMPERATURE: 97.2 F | WEIGHT: 255 LBS | HEIGHT: 65 IN | BODY MASS INDEX: 42.49 KG/M2

## 2020-11-13 PROCEDURE — 99213 OFFICE O/P EST LOW 20 MIN: CPT | Mod: 25

## 2020-11-13 PROCEDURE — 31231 NASAL ENDOSCOPY DX: CPT

## 2020-11-13 NOTE — PHYSICAL EXAM
[FreeTextEntry1] : General:\par The patient was alert and oriented and in no distress.\par Voice was clear.\par She has a disconjugate gaze\par \par Ears:\par The external ears were normal without deformity.\par The ear canals were clear.\par The tympanic membranes were intact and normal.\par \par Oral cavity:\par The oral mucosa was normal.\par The oral and base of tongue were clear and without mass.\par The gingival and buccal mucosa were moist and without lesions.\par The palate moved well.\par There was no cleft to the palate.\par There appeared to be good salivary flow.  \par There was no pus, erythema or mass in the oral cavity.\par \par Neck: \par The neck was symmetrical.\par The parotid and submandibular glands were normal without masses.\par The trachea was midline and there was no unusual crepitus.\par The thyroid was smooth and nontender and no masses were palpated.\par There was no significant cervical adenopathy.\par \par Neuro:\par Neurologically, the patient was awake, alert, and oriented to person, place and time. There were no obvious focal neurologic abnormalities.  Cranial nerves II through XII were grossly intact.\par \par Face:\par The patient had no facial asymmetry or mass.\par The skin was unremarkable.\par \par Nose: \par The external nose had no significant deformity.  There was no facial tenderness.                                                      Ears:\par The external ears were normal without deformity.\par The ear canals were clear.\par The tympanic membranes were intact and normal. [de-identified] : Additional Findings: Nasal endoscopy: \par CPT 97951\par Procedure Note:\par \par Endoscopy was done with Covid precautions and with video. All risks and benefits were discussed with the patient and consent obtained.\par \par Nasal endoscopy was done with topical anesthesia of Pontocaine and Afrin and a nasal endoscope.\par Indication: Nasal congestion, rule out sinusitis.\par Procedure: The nasal cavity was anesthetized with topical Afrin and Pontocaine. An endoscope was used and inserted into the nasal cavity.\par Attention was first paid to the anterior nasal cavity.\par Endocoscopy was performed to inspect the interior of the nasal cavity, the nasal septum, the middle and superior meati, the inferior, middle and superior turbinates, and the spheno-ethmoidal recesses, the nasopharynx and eustachian tube orifices bilaterally. \par All findings were normal except:\par \par She had the flat dry nasal mucosa. The right middle meatus was open. The left still  had a small amount of crusting on the septum, but less and visualization of the left antrum 360°  showed no further purulence or evidence of infection.

## 2020-11-13 NOTE — CONSULT LETTER
[FreeTextEntry2] : MIKAEL DAMON\par  [FreeTextEntry1] : \par \par Dear  Dr. MIKAEL DAMON,\par \par I had the pleasure of seeing your patient today.  \par Please see my note below.\par \par \par Thank you very much for allowing me to participate in the care of your patient.\par \par Sincerely,\par \par \par Sidney Ley MD\par [facility]\par [facility]\par  [facility]\par \par

## 2020-11-13 NOTE — HISTORY OF PRESENT ILLNESS
[de-identified] : EDWIN JALLOH Was seen in followup on November 13. She notes that she has been doing well. She is not had significant malodor or-colored discharge. Her dizziness has been better. She denies otalgia otorrhea or vertigo or significant tinnitus.The patient had no other ear nose or throat complaints at this v

## 2020-11-13 NOTE — ASSESSMENT
[FreeTextEntry1] : It was my impression that she was doing well. She has had no further crusting in her infection resolved just with debridement. At this point I recommend continuing on her current regimen and would like to reevaluate in 6 weeks for repeat debridement as needed.

## 2020-11-30 ENCOUNTER — APPOINTMENT (OUTPATIENT)
Dept: ENDOCRINOLOGY | Facility: CLINIC | Age: 65
End: 2020-11-30

## 2020-12-04 ENCOUNTER — APPOINTMENT (OUTPATIENT)
Dept: ORTHOPEDIC SURGERY | Facility: CLINIC | Age: 65
End: 2020-12-04

## 2020-12-21 ENCOUNTER — APPOINTMENT (OUTPATIENT)
Dept: OTOLARYNGOLOGY | Facility: CLINIC | Age: 65
End: 2020-12-21
Payer: MEDICARE

## 2020-12-21 VITALS — BODY MASS INDEX: 42.49 KG/M2 | HEIGHT: 65 IN | WEIGHT: 255 LBS | TEMPERATURE: 94.8 F

## 2020-12-21 PROBLEM — Z86.69 HISTORY OF ACUTE OTITIS MEDIA: Status: RESOLVED | Noted: 2019-10-28 | Resolved: 2020-12-21

## 2020-12-21 PROCEDURE — 31231 NASAL ENDOSCOPY DX: CPT

## 2020-12-21 PROCEDURE — 99214 OFFICE O/P EST MOD 30 MIN: CPT | Mod: 25

## 2020-12-21 NOTE — CONSULT LETTER
[FreeTextEntry2] : MIKAEL DAMON\par  [FreeTextEntry1] : \par \par Dear  Dr. MIKAEL DAMON,\par \par I had the pleasure of seeing your patient today.  \par Please see my note below.\par \par \par Thank you very much for allowing me to participate in the care of your patient.\par \par Sincerely,\par \par \par Sidney Ley MD\par [facility]\par [facility]\par  [facility]\par \par

## 2020-12-21 NOTE — PHYSICAL EXAM
[FreeTextEntry1] : General:\par The patient was alert and oriented and in no distress.\par Voice was clear.\par She has a disconjugate gaze\par \par Ears:\par The external ears were normal without deformity.\par The ear canals were clear.\par The tympanic membranes were intact and normal.\par \par Oral cavity:\par The oral mucosa was normal.\par The oral and base of tongue were clear and without mass.\par The gingival and buccal mucosa were moist and without lesions.\par The palate moved well.\par There was no cleft to the palate.\par There appeared to be good salivary flow.  \par There was no pus, erythema or mass in the oral cavity.\par \par Neck: \par The neck was symmetrical.\par The parotid and submandibular glands were normal without masses.\par The trachea was midline and there was no unusual crepitus.\par The thyroid was smooth and nontender and no masses were palpated.\par There was no significant cervical adenopathy.\par \par Neuro:\par Neurologically, the patient was awake, alert, and oriented to person, place and time. There were no obvious focal neurologic abnormalities.  Cranial nerves II through XII were grossly intact.\par \par Face:\par The patient had no facial asymmetry or mass.\par The skin was unremarkable.\par \par Nose: \par The external nose had no significant deformity.  There was no facial tenderness.                                                      Ears:\par The external ears were normal without deformity.\par The ear canals were clear.\par The tympanic membranes were intact and normal. [de-identified] : Nasal endoscopy: \par CPT 25735\par Procedure Note:\par \par Endoscopy was done with Covid precautions and with video. All risks and benefits were discussed with the patient and consent obtained.\par \par Nasal endoscopy was done with topical anesthesia of Pontocaine and Afrin and a      nasal endoscope.\par Indication:  Chronic sinusitis, ozena\par Procedure: The nasal cavity was anesthetized with topical Afrin and Pontocaine. An  endoscope was used and inserted into the nasal cavity.\par Attention was first paid to the anterior nasal cavity.\par Endocoscopy was performed to inspect the interior of the nasal cavity, the nasal septum,  the middle and superior meati, the inferior, middle and superior turbinates, and the spheno-ethmoidal  recesses, the nasopharynx and eustachian tube orifices bilaterally. \par All findings were normal except:\par \par Her sinuses all were patent. Reevaluation of the left antrum showed no significant crusting in the right middle meatus had a small amount of thick but clear mucus without polyps or purulence or masses.

## 2020-12-21 NOTE — ASSESSMENT
[FreeTextEntry1] : It was my impression that she had no recurrent essence of her previous vestibular dysfunction. The pathogenesis was discussed and I again suggested vestibular exercises. This is much better but if it does not resolve completely I would recommend vestibular therapy, as well. She has the sensory neural hearing losses and we discussed hearing aids but she wanted to hold off until after Covid\par She has the recurrent ozenoid changes of the sinuses but right now she looks quite good. I suggested repeat evaluation in 6 weeks for debridement as necessary.\par \par More than 30  minutes was spent on the patient's care today including review of their chart, and the  history, visit, physical exam,  evaluation of possible diagnoses,  discussion with the patient, ordering evaluations and prescriptions and charting.

## 2020-12-21 NOTE — HISTORY OF PRESENT ILLNESS
[de-identified] : EDWIN JALLOH Was seen in followup on December 21. She had had any acute episode of vertigo that lasted for close to 4 weeks. It was bed for 24-48 hours. She had some improvement in symptoms with meclizine. She has had this several times in the past.\par She notes that her sinuses have been a relatively clear.\par She also has a history of sensory neural hearing losses.\par The patient had no other ear nose or throat complaints at this visit.

## 2021-02-01 ENCOUNTER — APPOINTMENT (OUTPATIENT)
Dept: OTOLARYNGOLOGY | Facility: CLINIC | Age: 66
End: 2021-02-01

## 2021-04-20 ENCOUNTER — RX RENEWAL (OUTPATIENT)
Age: 66
End: 2021-04-20

## 2021-05-21 ENCOUNTER — APPOINTMENT (OUTPATIENT)
Dept: OTOLARYNGOLOGY | Facility: CLINIC | Age: 66
End: 2021-05-21
Payer: MEDICARE

## 2021-05-21 PROCEDURE — 31237 NSL/SINS NDSC SURG BX POLYPC: CPT

## 2021-05-21 PROCEDURE — 99214 OFFICE O/P EST MOD 30 MIN: CPT | Mod: 25

## 2021-05-21 PROCEDURE — 69210 REMOVE IMPACTED EAR WAX UNI: CPT

## 2021-05-21 RX ORDER — FLUCONAZOLE 200 MG/1
200 TABLET ORAL DAILY
Qty: 3 | Refills: 1 | Status: DISCONTINUED | COMMUNITY
Start: 2019-10-29 | End: 2021-05-21

## 2021-05-21 NOTE — HISTORY OF PRESENT ILLNESS
[de-identified] : EDWIN JALLOH is a 65 year old female who comes in complaining of A foul odor from the left nasal cavity. 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

## 2021-05-21 NOTE — PHYSICAL EXAM
[FreeTextEntry1] : General:\par The patient was alert and oriented and in no distress.\par Voice was clear.\par \par Face:\par The patient had no facial asymmetry or mass.\par The skin was unremarkable\par \par Oral cavity:\par The oral mucosa was normal.\par The oral and base of tongue were clear and without mass.\par The gingival and buccal mucosa were moist and without lesions.\par The palate moved well.\par There was no cleft to the palate.\par There appeared to be good salivary flow.  \par There was no pus, erythema or mass in the oral cavity.\par \par Neck: \par The neck was symmetrical.\par The parotid and submandibular glands were normal without masses.\par The trachea was midline and there was no unusual crepitus.\par The thyroid was smooth and nontender and no masses were palpated.\par There was no significant cervical adenopathy.\par \par Ears:\par  Procedure note:\par  Removal of Cerumen Impactions, both ears:  79310-26\par Both external ears were normal.\par There were symptomatic cerumen impactions in both ears.  These were cleared microscopically without trauma using both suction and curettes.  After clearing,  both ear canals were clear both eardrums were intact and mobile.  \par \par Nasal endoscopy: \par CPT 18403\par Procedure Note:\par \par Endoscopy was done with Covid precautions and with video. All risks and benefits were discussed with the patient and consent obtained.\par \par Nasal endoscopy was done with topical anesthesia of Pontocaine and Afrin and a      nasal endoscope.\par Indication: Nasal congestion, rule out sinusitis.\par Procedure: The nasal cavity was anesthetized with topical Afrin and Pontocaine. An  endoscope was used and inserted into the nasal cavity.\par Attention was first paid to the anterior nasal cavity.\par Endocoscopy was performed to inspect the interior of the nasal cavity, the nasal septum,  the middle and superior meati, the inferior, middle and superior turbinates, and the spheno-ethmoidal  recesses, the nasopharynx and eustachian tube orifices bilaterally. \par All findings were normal except:\par She had a right septal deflection to the right middle meatus was open. She had the exuberant removal of tissues from the nasal cavities bilaterally. The left middle meatus was obstructed with crusting and purulent discharge. With further anesthesia attention was paid to the left middle meatus for debridement CPT 11825.\par \par With the forward biting forceps the middle meatus was debrided of crusting.\par Beyond that, the ethmoid and left maxillary sinus were filled with mustard-colored foul-smelling discharge.\par The sinuses were then suctioned with a curved suction to clear\par Culture was taken endoscopically\par \par

## 2021-05-21 NOTE — ASSESSMENT
[FreeTextEntry1] : It was my impression that the patient had a cerumen impaction that was cleared.  I recommended topical moisturizing.  I recommended avoiding Q-tips.  I reviewed aural hygiene and the role of cerumen with the patient.  I suggested a repeat visit in a year or earlier should the need arise.\par \par She has the ozenoid recurrent sinusitis and had an obstruction to the left maxillary sinus that was debrided, cultured and suctioned clear.\par I placed her back on mupirocin rinses and the course of cefuroxime as her most recent sinus infection had been methicillin sensitive staph. I would change antibiotics if indicated by her culture.\par \par Her dizziness has improved\par I did not repeat her hearing test today.

## 2021-05-21 NOTE — CONSULT LETTER
[FreeTextEntry2] : MIKAEL DAMON\par  [FreeTextEntry1] : \par \par Dear  Dr. MIKAEL DAMON,\par \par I had the pleasure of seeing your patient today.  \par Please see my note below.\par \par \par Thank you very much for allowing me to participate in the care of your patient.\par \par Sincerely,\par \par \par Sidney Ley MD\par [facility]\par [facility]\par  [facility]\par \par

## 2021-06-11 ENCOUNTER — APPOINTMENT (OUTPATIENT)
Dept: OTOLARYNGOLOGY | Facility: CLINIC | Age: 66
End: 2021-06-11
Payer: MEDICARE

## 2021-06-11 VITALS — BODY MASS INDEX: 42.49 KG/M2 | WEIGHT: 255 LBS | HEIGHT: 65 IN | TEMPERATURE: 97.2 F

## 2021-06-11 DIAGNOSIS — H90.A32 MIXED CONDUCTIVE AND SENSORINEURAL HEARING, UNILATERAL, LEFT EAR WITH RESTRICTED HEARING ON THE  CONTRALATERAL SIDE: ICD-10-CM

## 2021-06-11 PROCEDURE — 92567 TYMPANOMETRY: CPT

## 2021-06-11 PROCEDURE — 31231 NASAL ENDOSCOPY DX: CPT

## 2021-06-11 PROCEDURE — 99214 OFFICE O/P EST MOD 30 MIN: CPT | Mod: 25

## 2021-06-11 PROCEDURE — 92557 COMPREHENSIVE HEARING TEST: CPT

## 2021-06-11 NOTE — HISTORY OF PRESENT ILLNESS
[de-identified] : EDWIN JALLOH Was seen in followup on June 11. Her sinuses have not been bothering her much but she comes in complaining of dizziness. She finds that she is unbalanced when she walks. She gets spinning sensation sometimes when she turns her head. She feels as if there was a fog over her eyes. She has not had any acute change in her hearing or otalgia.\par The patient had no other ear nose or throat complaints at this visit.

## 2021-06-11 NOTE — ASSESSMENT
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 Her hearing losses were symmetric and I discussed hearing aids with her.\par However, in the interim I refilled her meclizine to use as needed for symptomatic relief only.\par I recommend vestibular exercises and a referral for vestibular therapy.\par I would like to see her back in followup afterwards

## 2021-06-11 NOTE — PHYSICAL EXAM
[FreeTextEntry1] : \par The patient was alert and oriented and in no distress.\par Voice was clear.\par \par Face:\par The patient had no facial asymmetry or mass.\par The skin was unremarkable.\par She has the disconjugate gaze\par \par \par Nose: \par The external nose had no significant deformity.  There was no facial tenderness.  On anterior rhinoscopy, the nasal mucosa was clear.  The anterior septum was midline.  There were no visualized polyps purulence  or masses.\par \par Oral cavity:\par The oral mucosa was normal.\par The oral and base of tongue were clear and without mass.\par The gingival and buccal mucosa were moist and without lesions.\par The palate moved well.\par There was no cleft to the palate.\par There appeared to be good salivary flow.  \par There was no pus, erythema or mass in the oral cavity.\par \par \par Ears:\par The external ears were normal without deformity.\par The ear canals were clear.\par The tympanic membranes were intact and normal.\par \par Neck: \par The neck was symmetrical.\par The parotid and submandibular glands were normal without masses.\par The trachea was midline and there was no unusual crepitus.\par The thyroid was smooth and nontender and no masses were palpated.\par There was no significant cervical adenopathy.\par \par \par Neuro:\par Neurologically, the patient was awake, alert, and oriented to person, place and time. There were no obvious focal neurologic abnormalities.  Cranial nerves II through XII were grossly intact.\par \par \par TMJ:\par The temporomandibular joints were nontender.\par There was no abnormal crepitus and no significant malocclusion\par \par Otoneuro:\par No significant nystagmus was noted.\par Finger nose finger and rapid alternating motions were normal.\par Romberg testing was normal.\par [place]-Hallpike maneuver was negative.\par There was no bruit or thrill in the neck.\par She had some complaints of dizziness and down to the left but no vertigo or nystagmus\par  [de-identified] : \par \par Her blood pressure was 131/77 sitting and 129/60 standing\par Complete audiometric evaluation was done and reviewed with the patient and compared to previous. This showed a tissue and configuration without much change from previous and normal tympanograms.\par Nasal endoscopy: \par CPT 76112\par Procedure Note:\par \par Endoscopy was done with Covid precautions and with video. All risks and benefits were discussed with the patient and consent obtained.\par \par Nasal endoscopy was done with topical anesthesia of Pontocaine and Afrin and a      nasal endoscope.\par Indication: Nasal congestion, rule out sinusitis.\par Procedure: The nasal cavity was anesthetized with topical Afrin and Pontocaine. An  endoscope was used and inserted into the nasal cavity.\par Attention was first paid to the anterior nasal cavity.\par Endocoscopy was performed to inspect the interior of the nasal cavity, the nasal septum,  the middle and superior meati, the inferior, middle and superior turbinates, and the spheno-ethmoidal  recesses, the nasopharynx and eustachian tube orifices bilaterally. \par All findings were normal except:\par \par On the right side, she had the inferior meatal antrostomy and an open osteomeatal unit. On the left,the sinusitis has completely resolved

## 2021-06-30 ENCOUNTER — APPOINTMENT (OUTPATIENT)
Dept: OTOLARYNGOLOGY | Facility: CLINIC | Age: 66
End: 2021-06-30
Payer: MEDICARE

## 2021-06-30 PROCEDURE — 99214 OFFICE O/P EST MOD 30 MIN: CPT | Mod: 25

## 2021-06-30 PROCEDURE — 31237 NSL/SINS NDSC SURG BX POLYPC: CPT

## 2021-06-30 RX ORDER — CEFUROXIME AXETIL 250 MG/1
250 TABLET ORAL
Qty: 20 | Refills: 1 | Status: DISCONTINUED | COMMUNITY
Start: 2019-10-28 | End: 2021-06-30

## 2021-06-30 RX ORDER — MUPIROCIN 20 MG/G
2 OINTMENT TOPICAL
Refills: 0 | Status: DISCONTINUED | COMMUNITY
End: 2021-06-30

## 2021-06-30 RX ORDER — FLUCONAZOLE 200 MG/1
200 TABLET ORAL DAILY
Qty: 3 | Refills: 1 | Status: DISCONTINUED | COMMUNITY
Start: 2020-02-19 | End: 2021-06-30

## 2021-06-30 RX ORDER — MUPIROCIN 20 MG/G
2 OINTMENT TOPICAL
Qty: 1 | Refills: 5 | Status: DISCONTINUED | COMMUNITY
Start: 2021-05-21 | End: 2021-06-30

## 2021-06-30 RX ORDER — MUPIROCIN 20 MG/G
2 OINTMENT TOPICAL
Qty: 1 | Refills: 5 | Status: DISCONTINUED | COMMUNITY
Start: 2020-02-19 | End: 2021-06-30

## 2021-06-30 RX ORDER — MUPIROCIN 20 MG/G
2 OINTMENT TOPICAL
Qty: 5 | Refills: 3 | Status: DISCONTINUED | COMMUNITY
Start: 2019-03-19 | End: 2021-06-30

## 2021-06-30 RX ORDER — FLUCONAZOLE 200 MG/1
200 TABLET ORAL DAILY
Qty: 3 | Refills: 1 | Status: DISCONTINUED | COMMUNITY
Start: 2020-12-21 | End: 2021-06-30

## 2021-06-30 RX ORDER — MUPIROCIN 20 MG/G
2 OINTMENT TOPICAL
Qty: 5 | Refills: 3 | Status: DISCONTINUED | COMMUNITY
Start: 2019-06-18 | End: 2021-06-30

## 2021-06-30 RX ORDER — FLUCONAZOLE 200 MG/1
200 TABLET ORAL DAILY
Qty: 3 | Refills: 1 | Status: DISCONTINUED | COMMUNITY
Start: 2019-11-04 | End: 2021-06-30

## 2021-07-06 NOTE — ADDENDUM
[FreeTextEntry1] : 7/6/21   culture MRSA  sens to cipro (allergic) and bactrim.  Will Rx with the later if does not respond.   RLP\par \par started in bactroban rinses.

## 2021-07-06 NOTE — ASSESSMENT
[FreeTextEntry1] : It was my impression that she has had improvement in her vestibular dysfunction. I suggested continuing with vestibular exercises. She had started vestibular therapy this week and I recommended continuing and expected she shall continue to improve.\par She again has the purulent left maxillary sinusitis but this is quite early with less crusting and she frequently gets. She prefers not to take an antibiotic and at this point I hope she will respond to just the debridement and nasal rinsing and I would like to reevaluate in 3-4 weeks to make sure that this response. If not, I would treat based on her culture.

## 2021-07-06 NOTE — CONSULT LETTER
[FreeTextEntry2] : MIKAEL DAMON\par  [FreeTextEntry1] : \par \par Dear  Dr. MIKAEL DAMON,\par \par I had the pleasure of seeing your patient today.  \par Please see my note below.\par \par \par Thank you very much for allowing me to participate in the care of your patient.\par \par Sincerely,\par \par \par Sidney Ley MD\par [facility]\par [facility]\par  [facility]\par \par

## 2021-07-06 NOTE — PHYSICAL EXAM
[FreeTextEntry1] : \par The patient was alert and oriented and in no distress.\par Voice was clear.\par \par Face:\par The patient had no facial asymmetry or mass.\par The skin was unremarkable.\par She has the disconjugate gaze\par \par \par Nose: \par The external nose had no significant deformity.  There was no facial tenderness.  On anterior rhinoscopy, the nasal mucosa was clear.  The anterior septum was midline.  There were no visualized polyps purulence  or masses.\par \par Oral cavity:\par The oral mucosa was normal.\par The oral and base of tongue were clear and without mass.\par The gingival and buccal mucosa were moist and without lesions.\par The palate moved well.\par There was no cleft to the palate.\par There appeared to be good salivary flow.  \par There was no pus, erythema or mass in the oral cavity.\par \par \par Ears:\par The external ears were normal without deformity.\par The ear canals were clear.\par The tympanic membranes were intact and normal.\par \par Neck: \par The neck was symmetrical.\par The parotid and submandibular glands were normal without masses.\par The trachea was midline and there was no unusual crepitus.\par The thyroid was smooth and nontender and no masses were palpated.\par There was no significant cervical adenopathy.\par \par \par Neuro:\par Neurologically, the patient was awake, alert, and oriented to person, place and time. There were no obvious focal neurologic abnormalities.  Cranial nerves II through XII were grossly intact.\par \par \par TMJ:\par The temporomandibular joints were nontender.\par There was no abnormal crepitus and no significant malocclusion\par \par Otoneuro:\par No significant nystagmus was noted.\par Finger nose finger and rapid alternating motions were normal.\par Romberg testing was normal.\par [place]-Hallpike maneuver was negative.\par There was no bruit or thrill in the neck.\par She had some complaints of dizziness and down to the left but no vertigo or nystagmus\par  [de-identified] : Nasal endoscopy was done with topical anesthesia the flexible endoscope. This showed the right sided septal deflection but there was eddie yellow mucopus from the left antrum.\par Further anesthesia was applied and attention was paid to the left ethmoid and  her antrum.\par A pediatric 30° endoscope was used and first a culture was taken. Then with straight and curved suctions the left antrum was suctioned and debrided clear of mustard-colored purulent discharge.\par CPT 96360

## 2021-07-06 NOTE — HISTORY OF PRESENT ILLNESS
[de-identified] : EDWIN JALLOH Was seen in followup on June 30. She notes her dizziness has improved although not resolved. She had her first vestibular therapy appointment this week.\par She notes her sinuses have not been bothersome. She has the history of primarily left-sided ozenoid changes.The patient had no other ear nose or throat complaints at this visit.

## 2021-07-08 ENCOUNTER — RX RENEWAL (OUTPATIENT)
Age: 66
End: 2021-07-08

## 2021-07-14 ENCOUNTER — APPOINTMENT (OUTPATIENT)
Dept: OTOLARYNGOLOGY | Facility: CLINIC | Age: 66
End: 2021-07-14
Payer: MEDICARE

## 2021-07-14 VITALS — WEIGHT: 255 LBS | TEMPERATURE: 95.3 F | BODY MASS INDEX: 42.49 KG/M2 | HEIGHT: 65 IN

## 2021-07-14 PROCEDURE — 92557 COMPREHENSIVE HEARING TEST: CPT

## 2021-07-14 PROCEDURE — 99214 OFFICE O/P EST MOD 30 MIN: CPT | Mod: 25

## 2021-07-14 PROCEDURE — 92567 TYMPANOMETRY: CPT

## 2021-07-14 PROCEDURE — 31237 NSL/SINS NDSC SURG BX POLYPC: CPT

## 2021-07-14 NOTE — REASON FOR VISIT
[Subsequent Evaluation] : a subsequent evaluation for [Tinnitus] : tinnitus [Vertigo] : vertigo [Sinusitis] : sinusitis

## 2021-07-14 NOTE — HISTORY OF PRESENT ILLNESS
[de-identified] : EDWIN JALLOH Seen in followup on July 14. I had seen her 2 weeks ago with a methicillin resistant staph left-sided sinusitis. This was treated with mupirocin rinses and without oral antibiotics and she feels improved. She is on amoxicillin for a right dental infection, however the MRSA was resistant to that.\par She comes in complaining of new onset of a left-sided pulsatile tinnitus. His most of the time, however not at the time of her visit and she believes it is in concert with her heartbeat.\par The patient had no other ear nose or throat complaints at this visit.

## 2021-07-14 NOTE — PHYSICAL EXAM
[FreeTextEntry1] : General:\par The patient was alert and oriented and in no distress.\par Voice was clear.\par \par Face:\par The patient had no facial asymmetry or mass.\par The skin was unremarkable.\par \par Oral cavity:\par The oral mucosa was normal.\par The oral and base of tongue were clear and without mass.\par The gingival and buccal mucosa were moist and without lesions.\par The palate moved well.\par There was no cleft to the palate.\par There appeared to be good salivary flow.  \par There was no pus, erythema or mass in the oral cavity.\par \par Neck: \par The neck was symmetrical.\par The parotid and submandibular glands were normal without masses.\par The trachea was midline and there was no unusual crepitus.\par The thyroid was smooth and nontender and no masses were palpated.\par There was no significant cervical adenopathy.\par \par Neuro:\par Neurologically, the patient was awake, alert, and oriented to person, place and time. There were no obvious focal neurologic abnormalities.  Cranial nerves II through XII were grossly intact.\par \par Otoneuro:\par No significant nystagmus was noted.  He has the left-sided strabismus\par Finger nose finger and rapid alternating motions were normal.\par Romberg testing was normal.\par [place]-Hallpike maneuver was negative.\par There was no bruit or thrill in the neck.\par \par Ears:\par The external ears were normal without deformity.\par The ear canals were clear.\par The tympanic membranes were intact and normal.\par She has a small monomer on the right\par \par  [de-identified] : Because of the new onset of pulsatile tinnitus, an audiogram was done again and compared to her previous. This showed a sensorineural hearing losses and abnormal tympanograms and this was unchanged.\par \par She had no objective tinnitus\par No pulsations were audible in her temporal area\par \par Nasal endoscopy: \par CPT 02931\par Procedure Note:\par \par Endoscopy was done with Covid precautions and with video. All risks and benefits were discussed with the patient and consent obtained.\par \par Nasal endoscopy was done with topical anesthesia of Pontocaine and Afrin and a      nasal endoscope.\par Indication: Nasal congestion, rule out sinusitis.\par Procedure: The nasal cavity was anesthetized with topical Afrin and Pontocaine. An  endoscope was used and inserted into the nasal cavity.\par Attention was first paid to the anterior nasal cavity.\par Endocoscopy was performed to inspect the interior of the nasal cavity, the nasal septum,  the middle and superior meati, the inferior, middle and superior turbinates, and the spheno-ethmoidal  recesses, the nasopharynx and eustachian tube orifices bilaterally. \par All findings were normal except:\par She still had eddie Green mucopus coming out of the left antrum with a large amount of crust.\par \par Further anesthesia was given to the left middle meatus.\par A rigid pediatric 30° endoscope was used and first with a forward biting forceps a large moderate sized yellow-green crust was removed from the antrum\par With straight and antral curved suctions the left antrum was suctioned and debrided to clear\par CPT 63429

## 2021-07-14 NOTE — ASSESSMENT
[FreeTextEntry1] : It was my impression that she has been no onset of a left-sided pulsatile tinnitus. It seems like this may be due to increased blood flow in the area from her purulent sinusitis. Her last visit this was methicillin resistant staph and she had been treated with Bactroban alone, but this was not successful.\par Sinuses were debrided.\par She was currently on amoxicillin for a recent dental procedure.\par The MRSA was sensitive to clindamycin and she is allergic to quinolones. I felt was likely that the clindamycin could cover both dental procedure and the sinuses.\par I also gave her a prescription for Diflucan and suggested probiotics and would like to reevaluate in 2 weeks to make sure that this is responding. I suggested continuing on Bactroban rinses\par I did not add rifampin but would if she is not responding.\par \par I would suggest further evaluation of her pulsatile tinnitus if it does not resolve with the resolution of her infection.

## 2021-07-14 NOTE — CONSULT LETTER
[FreeTextEntry2] : MIKAEL DAMON\par  [FreeTextEntry1] : \par \par Dear  Dr. MIKAEL DAMON,\par \par I had the pleasure of seeing your patient today.  \par Please see my note below.\par \par \par Thank you very much for allowing me to participate in the care of your patient.\par \par Sincerely,\par \par \par Sidney Ley MD\par [facility]\par [facility]\par  [facility]\par \par

## 2021-07-23 ENCOUNTER — APPOINTMENT (OUTPATIENT)
Dept: OTOLARYNGOLOGY | Facility: CLINIC | Age: 66
End: 2021-07-23
Payer: MEDICARE

## 2021-07-23 PROCEDURE — 31231 NASAL ENDOSCOPY DX: CPT

## 2021-07-23 PROCEDURE — 99214 OFFICE O/P EST MOD 30 MIN: CPT | Mod: 25

## 2021-08-03 ENCOUNTER — APPOINTMENT (OUTPATIENT)
Dept: OTOLARYNGOLOGY | Facility: CLINIC | Age: 66
End: 2021-08-03

## 2021-08-05 NOTE — ADDENDUM
[FreeTextEntry1] : 8/4/21   carotid duplex shows mild plaques-- suggest follow up with Dr. Haynes-  if bothersome will consider further evaluation  (MRA/V?)\par \par RLP

## 2021-08-05 NOTE — ASSESSMENT
[FreeTextEntry1] : It was my impression that her dizziness has improved with a vestibular therapy and I recommended continuing.\par Her purulent sinusitis that was found clinically to clindamycin. She still has a couple of days and I asked her to complete the course.\par Lastly, she had the onset of the intermittent otitis in the left ear. Unfortunately again was not presents today at the time of her visit.\par I want her to finish off the antibiotics in case this was from an increased vascular flow from her infection\par I asked her follow up first with a Doppler duplex external carotid evaluation if this doesn't resolve after the antibiotics.\par If not diagnositic and persistent I would suggest further vascular evaluation.\par

## 2021-08-05 NOTE — HISTORY OF PRESENT ILLNESS
[de-identified] : EDWIN JALLOH Was seen in followup on July 23. Her dizziness is improved with vestibular therapy. She has improved nasal discharge but still has an intermittent pulsatile tinnitus in the left ear. She does not have it at the time of her visit, however.The patient had no other ear nose or throat complaints at this visit.

## 2021-08-05 NOTE — CONSULT LETTER
[FreeTextEntry2] : MIKAEL DAMON\par  [FreeTextEntry1] : \par \par Dear  Dr. MIKAEL DAMON,\par \par I had the pleasure of seeing your patient today.  \par Please see my note below.\par \par \par Thank you very much for allowing me to participate in the care of your patient.\par \par Sincerely,\par \par \par Sidney Ley MD\par [facility]\par [facility]\par  [facility]\par \par

## 2021-08-13 ENCOUNTER — APPOINTMENT (OUTPATIENT)
Dept: ORTHOPEDIC SURGERY | Facility: CLINIC | Age: 66
End: 2021-08-13
Payer: MEDICARE

## 2021-08-13 DIAGNOSIS — S42.292D OTHER DISPLACED FRACTURE OF UPPER END OF LEFT HUMERUS, SUBSEQUENT ENCOUNTER FOR FRACTURE WITH ROUTINE HEALING: ICD-10-CM

## 2021-08-13 PROCEDURE — 99213 OFFICE O/P EST LOW 20 MIN: CPT

## 2021-08-13 RX ORDER — MUPIROCIN 20 MG/G
2 OINTMENT TOPICAL
Qty: 1 | Refills: 5 | Status: DISCONTINUED | COMMUNITY
Start: 2021-07-06 | End: 2021-08-13

## 2021-08-13 RX ORDER — FLUCONAZOLE 200 MG/1
200 TABLET ORAL DAILY
Qty: 3 | Refills: 0 | Status: DISCONTINUED | COMMUNITY
Start: 2021-05-21 | End: 2021-08-13

## 2021-08-13 RX ORDER — FLUCONAZOLE 200 MG/1
200 TABLET ORAL DAILY
Qty: 3 | Refills: 1 | Status: DISCONTINUED | COMMUNITY
Start: 2021-07-14 | End: 2021-08-13

## 2021-08-13 RX ORDER — FLUTICASONE PROPIONATE 50 UG/1
50 SPRAY, METERED NASAL DAILY
Qty: 1 | Refills: 5 | Status: DISCONTINUED | COMMUNITY
Start: 2019-11-04 | End: 2021-08-13

## 2021-08-13 RX ORDER — MECLIZINE HYDROCHLORIDE 25 MG/1
25 TABLET ORAL
Qty: 30 | Refills: 0 | Status: DISCONTINUED | COMMUNITY
Start: 2021-06-11 | End: 2021-08-13

## 2021-08-13 RX ORDER — CEFUROXIME AXETIL 250 MG/1
250 TABLET ORAL
Qty: 20 | Refills: 1 | Status: DISCONTINUED | COMMUNITY
Start: 2021-05-21 | End: 2021-08-13

## 2021-08-13 RX ORDER — MELOXICAM 15 MG/1
15 TABLET ORAL
Qty: 30 | Refills: 2 | Status: DISCONTINUED | COMMUNITY
Start: 2020-09-25 | End: 2021-08-13

## 2021-08-16 PROBLEM — S42.292D OTHER CLOSED DISPLACED FRACTURE OF PROXIMAL END OF LEFT HUMERUS WITH ROUTINE HEALING, SUBSEQUENT ENCOUNTER: Status: ACTIVE | Noted: 2019-04-24

## 2021-08-18 ENCOUNTER — APPOINTMENT (OUTPATIENT)
Dept: OTOLARYNGOLOGY | Facility: CLINIC | Age: 66
End: 2021-08-18
Payer: MEDICARE

## 2021-08-18 VITALS — TEMPERATURE: 97.2 F | WEIGHT: 255 LBS | HEIGHT: 65 IN | BODY MASS INDEX: 42.49 KG/M2

## 2021-08-18 PROCEDURE — 99213 OFFICE O/P EST LOW 20 MIN: CPT | Mod: 25

## 2021-08-18 PROCEDURE — 31231 NASAL ENDOSCOPY DX: CPT

## 2021-08-18 NOTE — CONSULT LETTER
[FreeTextEntry2] : MIKAEL DAMON\par  [FreeTextEntry1] : \par \par Dear  Dr. MIKAEL DAMON,\par \par I had the pleasure of seeing your patient today.  \par Please see my note below.\par \par \par Thank you very much for allowing me to participate in the care of your patient.\par \par Sincerely,\par \par \par Sidney Ley MD\par [facility]\par [facility]\par  [facility]\par \par

## 2021-08-18 NOTE — ASSESSMENT
[FreeTextEntry1] : It was my impression that of purulent sinusitis had resolved and her Ozena is improving.  I recommended continuing on her current regimen.\par Her dizziness is better but not resolve and I suggested continuing with home exercise as it would at her return for vestibular therapy if needed.\par The pulsatile tinnitus is better. The option of further vascular studies-MRA and MRV were discussed but not necessarily recommended. I will do so if this becomes worse again or more problematic.\par Otherwise, I would like to see her back in followup in 6 weeks or earlier if needed

## 2021-08-18 NOTE — REASON FOR VISIT
[Subsequent Evaluation] : a subsequent evaluation for [Vertigo] : vertigo [Sinusitis] : sinusitis [FreeTextEntry2] : ears

## 2021-08-18 NOTE — PHYSICAL EXAM
[FreeTextEntry1] : General:\par The patient was alert and oriented and in no distress.\par Voice was clear.\par \par Face:\par The patient had no facial asymmetry or mass.\par The skin was unremarkable.\par \par Ears:\par The external ears were normal without deformity.\par The ear canals were clear.\par The tympanic membranes were intact and normal.\par She had the obvious hearing losses\par \par Oral cavity:\par The oral mucosa was normal.\par The oral and base of tongue were clear and without mass.\par The gingival and buccal mucosa were moist and without lesions.\par The palate moved well.\par There was no cleft to the palate.\par There appeared to be good salivary flow.  \par There was no pus, erythema or mass in the oral cavity.\par \par Neck: \par The neck was symmetrical.\par The parotid and submandibular glands were normal without masses.\par The trachea was midline and there was no unusual crepitus.\par The thyroid was smooth and nontender and no masses were palpated.\par There was no significant cervical adenopathy.\par \par TMJ:\par The temporomandibular joints were nontender.\par There was no abnormal crepitus and no significant malocclusion\par Her temporomandibular joint dysfunction was improved\par \par Nasal endoscopy:\par \par Nasal endoscopy was done with topical anesthesia and a flexible endoscope to evaluate for nasal polyps, chronic sinusitis and response to therapy.\par Endocoscopy was performed to inspect the interior of the nasal cavity, the nasal septum,  the middle and superior meati, the inferior, middle and superior turbinates, and the spheno-ethmoidal  recesses, the nasopharynx and eustachian tube orifices bilaterally\par \par Endoscopy was done with Covid precautions and with video. All risks and benefits were discussed with the patient and consent obtained.\par \par \par Evaluation showed that the septum was midline.  There was no significant mucosal disease.  The inferior turbinates and middle turbinates were normal.  On both sides, the surgically opened ethmoids, antra, and frontal recesses were clear.  There was no evidence of polypoid recurrences and no purulence.  The mucosa was close to stage zero.  The nasopharynx was benign.    The middle and  superior meati were normal and the sphenoethmoidal recesses were without evidence of disease\par The purulence had resolved from the left antrum and ethmoid and she did not have crusting at this time.\par \par

## 2021-08-18 NOTE — HISTORY OF PRESENT ILLNESS
[de-identified] : EDWIN JALLOH Was seen in follow up on August 18. She had a carotid Doppler which showed less than 50% stenosis and will be followed by her primary physician. The intermittent pulsatile tinnitus seems to be less bothersome and less frequent.\par Her imbalance is improved after her vestibular therapy.\par Her nasal purulence is better.\par She has a history of ozena and sensorineural hearing losses as well as a history of temporomandibular joint dysfunction.\par The patient had no other ear nose or throat complaints at this visit.

## 2021-09-13 ENCOUNTER — APPOINTMENT (OUTPATIENT)
Dept: OTOLARYNGOLOGY | Facility: CLINIC | Age: 66
End: 2021-09-13
Payer: MEDICARE

## 2021-09-13 VITALS — HEIGHT: 65 IN | TEMPERATURE: 97.3 F | WEIGHT: 255 LBS | BODY MASS INDEX: 42.49 KG/M2

## 2021-09-13 PROCEDURE — 31231 NASAL ENDOSCOPY DX: CPT

## 2021-09-13 PROCEDURE — 99213 OFFICE O/P EST LOW 20 MIN: CPT | Mod: 25

## 2021-09-13 NOTE — HISTORY OF PRESENT ILLNESS
[de-identified] : EDWIN JALLOH Was seen in followup on September 13. She notes that her imbalance is improved and her nasal purulence is better but she is not sure whether she may have a recurrence on the left. She has a history of those seen and sensory neural hearing losses as well as temporomandibular joint dysfunction and dizziness.The patient had no other ear nose or throat complaints at this visit.

## 2021-09-13 NOTE — CONSULT LETTER
[FreeTextEntry2] : MIKAEL DAMON\par  [FreeTextEntry1] : \par \par Dear  Dr. MIKAEL DAMON,\par \par I had the pleasure of seeing your patient today.  \par Please see my note below.\par \par \par Thank you very much for allowing me to participate in the care of your patient.\par \par Sincerely,\par \par \par Sidney Ley MD\par [facility]\par [facility]\par  [facility]\par \par

## 2021-09-21 ENCOUNTER — APPOINTMENT (OUTPATIENT)
Dept: ORTHOPEDIC SURGERY | Facility: CLINIC | Age: 66
End: 2021-09-21
Payer: MEDICARE

## 2021-09-21 ENCOUNTER — OUTPATIENT (OUTPATIENT)
Dept: OUTPATIENT SERVICES | Facility: HOSPITAL | Age: 66
LOS: 1 days | End: 2021-09-21
Payer: MEDICARE

## 2021-09-21 ENCOUNTER — RESULT REVIEW (OUTPATIENT)
Age: 66
End: 2021-09-21

## 2021-09-21 PROCEDURE — 20611 DRAIN/INJ JOINT/BURSA W/US: CPT | Mod: RT

## 2021-09-21 PROCEDURE — 73030 X-RAY EXAM OF SHOULDER: CPT | Mod: 26,RT

## 2021-09-21 PROCEDURE — 99213 OFFICE O/P EST LOW 20 MIN: CPT | Mod: 25

## 2021-10-04 ENCOUNTER — APPOINTMENT (OUTPATIENT)
Dept: OTOLARYNGOLOGY | Facility: CLINIC | Age: 66
End: 2021-10-04

## 2021-10-22 ENCOUNTER — APPOINTMENT (OUTPATIENT)
Dept: ORTHOPEDIC SURGERY | Facility: CLINIC | Age: 66
End: 2021-10-22
Payer: MEDICARE

## 2021-10-22 VITALS — HEIGHT: 65 IN | BODY MASS INDEX: 42.49 KG/M2 | WEIGHT: 255 LBS

## 2021-10-22 PROCEDURE — 99213 OFFICE O/P EST LOW 20 MIN: CPT

## 2021-11-05 ENCOUNTER — APPOINTMENT (OUTPATIENT)
Dept: OTOLARYNGOLOGY | Facility: CLINIC | Age: 66
End: 2021-11-05
Payer: MEDICARE

## 2021-11-05 VITALS — TEMPERATURE: 98 F | HEIGHT: 65 IN | WEIGHT: 255 LBS | BODY MASS INDEX: 42.49 KG/M2

## 2021-11-05 DIAGNOSIS — H69.80 OTHER SPECIFIED DISORDERS OF EUSTACHIAN TUBE, UNSPECIFIED EAR: ICD-10-CM

## 2021-11-05 DIAGNOSIS — S03.00XA DISLOCATION OF JAW, UNSPECIFIED SIDE, INITIAL ENCOUNTER: ICD-10-CM

## 2021-11-05 PROCEDURE — 99213 OFFICE O/P EST LOW 20 MIN: CPT | Mod: 25

## 2021-11-05 PROCEDURE — 31231 NASAL ENDOSCOPY DX: CPT

## 2021-11-05 NOTE — PHYSICAL EXAM
[FreeTextEntry1] : General:\par The patient was alert and oriented and in no distress.\par Voice was clear.Face:\par The patient had no facial asymmetry or mass.\par The skin was unremarkable.Ears:\par The external ears were normal without deformity.\par The ear canals were clear.\par The tympanic membranes were intact and normal.Oral cavity:\par The oral mucosa was normal.\par The oral and base of tongue were clear and without mass.\par The gingival and buccal mucosa were moist and without lesions.\par The palate moved well.\par There was no cleft to the palate.\par There appeared to be good salivary flow.  \par There was no pus, erythema or mass in the oral cavity.Neck: \par The neck was symmetrical.\par The parotid and submandibular glands were normal without masses.\par The trachea was midline and there was no unusual crepitus.\par The thyroid was smooth and nontender and no masses were palpated.\par There was no significant cervical adenopathy. [de-identified] : Nasal endoscopy:\par \par Nasal endoscopy was done with topical anesthesia and a flexible endoscope to evaluate for nasal polyps, chronic sinusitis and response to therapy.\par Endocoscopy was performed to inspect the interior of the nasal cavity, the nasal septum,  the middle and superior meati, the inferior, middle and superior turbinates, and the spheno-ethmoidal  recesses, the nasopharynx and eustachian tube orifices bilaterally\par \par Endoscopy was done with Covid precautions and with video. All risks and benefits were discussed with the patient and consent obtained.\par \par \par Evaluation showed that the septum was midline.  There was no significant mucosal disease.  The inferior turbinates and middle turbinates were normal.  On both sides, the surgically opened ethmoids, antra, and frontal recesses were clear.  There was no evidence of polypoid recurrences and no purulence.  The mucosa was close to stage zero.  The nasopharynx was benign.    The middle and  superior meati were normal and the sphenoethmoidal recesses were without evidence of disease

## 2021-11-05 NOTE — ASSESSMENT
[FreeTextEntry1] : It was my impression that she was doing well. I didn't see evidence of recurrent sinusitis and significant crusting and I reviewed this with her. I recommended continuing on her current regimen and would like to re re evaluating a month or earlier if the symptoms or

## 2021-11-05 NOTE — CONSULT LETTER
[FreeTextEntry2] : MIKAEL DAMON\par  [FreeTextEntry1] : \par \par Dear  Dr. MIKAEL DAMON,\par \par I had the pleasure of seeing your patient today.  \par Please see my note below.\par \par \par Thank you very much for allowing me to participate in the care of your patient.\par \par Sincerely,\par \par \par Sidney Ley MD\par [facility]\par [facility]\par  Health system\par \par

## 2021-11-05 NOTE — HISTORY OF PRESENT ILLNESS
[de-identified] : EDWIN JALLOH Comes in complaining that she may have a sinus infection. She missed her previous appointment because she had facial herpes zoster which has resolved. She feels congested and a bit off of her vertigo has not recurred.The patient had no other ear nose or throat complaints at this visit.

## 2021-12-08 ENCOUNTER — APPOINTMENT (OUTPATIENT)
Dept: OTOLARYNGOLOGY | Facility: CLINIC | Age: 66
End: 2021-12-08
Payer: MEDICARE

## 2021-12-08 PROCEDURE — 31231 NASAL ENDOSCOPY DX: CPT

## 2021-12-08 PROCEDURE — 99213 OFFICE O/P EST LOW 20 MIN: CPT | Mod: 25

## 2021-12-08 NOTE — PHYSICAL EXAM
[FreeTextEntry1] : \par The patient was alert and oriented and in no distress.\par Voice was clear.\par \par Face:\par The patient had no facial asymmetry or mass.\par The skin was unremarkable.\par \par Eyes:\par The pupils were equal round and reactive to light and accommodation.\par She has the disconjugate gaze.\par \par Nose: \par The external nose had no significant deformity.  There was no facial tenderness.  On anterior rhinoscopy, the nasal mucosa was clear.  The anterior septum was midline.  There were no visualized polyps purulence  or masses.\par \par Oral cavity:\par The oral mucosa was normal.\par The oral and base of tongue were clear and without mass.\par The gingival and buccal mucosa were moist and without lesions.\par The palate moved well.\par There was no cleft to the palate.\par There appeared to be good salivary flow.  \par There was no pus, erythema or mass in the oral cavity.\par \par \par Ears:\par The external ears were normal without deformity.\par The ear canals were clear.\par The tympanic membranes were intact and normal.\par \par Neck: \par The neck was symmetrical.\par The parotid and submandibular glands were normal without masses.\par The trachea was midline and there was no unusual crepitus.\par The thyroid was smooth and nontender and no masses were palpated.\par There was no significant cervical adenopathy.\par \par \par Neuro:\par Neurologically, the patient was awake, alert, and oriented to person, place and time. There were no obvious focal neurologic abnormalities.  Cranial nerves II through XII were grossly intact.\par \par \par TMJ:\par The temporomandibular joints were nontender.\par There was no abnormal crepitus and no significant malocclusion\par \par  [de-identified] : Nasal endoscopy:\par \par Nasal endoscopy was done with topical anesthesia and a flexible endoscope to evaluate for nasal polyps, chronic sinusitis and response to therapy.\par Endocoscopy was performed to inspect the interior of the nasal cavity, the nasal septum,  the middle and superior meati, the inferior, middle and superior turbinates, and the spheno-ethmoidal  recesses, the nasopharynx and eustachian tube orifices bilaterally\par \par Endoscopy was done with Covid precautions and with video. All risks and benefits were discussed with the patient and consent obtained.\par \par \par Evaluation showed that the septum was midline.  There was no significant mucosal disease.  The inferior turbinates and middle turbinates were normal.  On both sides, the surgically opened ethmoids, antra, and frontal recesses were clear.  There was no evidence of polypoid recurrences and no purulence.  The mucosa was close to stage zero.  The nasopharynx was benign.    The middle and  superior meati were normal and the sphenoethmoidal recesses were without evidence of disease\par \par She had dry patches on the septum bilaterally and widely patent sinuses on the left. 360 evaluation showed no evidence of recurrent crusting or sinus infection

## 2021-12-08 NOTE — CONSULT LETTER
[FreeTextEntry2] : MIKAEL DAMON\par  [FreeTextEntry1] : \par \par Dear  Dr. MIKAEL DAMON,\par \par I had the pleasure of seeing your patient today.  \par Please see my note below.\par \par \par Thank you very much for allowing me to participate in the care of your patient.\par \par Sincerely,\par \par \par Sidney Ley MD\par [facility]\par [facility]\par  [facility]\par \par

## 2021-12-08 NOTE — HISTORY OF PRESENT ILLNESS
[de-identified] : EDWIN JALLOH Was seen in followup on December 8. She has the ozenoid recurrent left-sided sinusitis. She has been doing well and has not had any significant purulence but is concerned because she is having surgery soon.  The patient had no other ear nose or throat complaints at this v and wants to be reevaluated.

## 2021-12-10 ENCOUNTER — APPOINTMENT (OUTPATIENT)
Dept: ORTHOPEDIC SURGERY | Facility: CLINIC | Age: 66
End: 2021-12-10
Payer: MEDICARE

## 2021-12-10 DIAGNOSIS — M75.41 IMPINGEMENT SYNDROME OF RIGHT SHOULDER: ICD-10-CM

## 2021-12-10 PROCEDURE — 20611 DRAIN/INJ JOINT/BURSA W/US: CPT | Mod: RT

## 2021-12-10 PROCEDURE — 99213 OFFICE O/P EST LOW 20 MIN: CPT | Mod: 25

## 2022-01-24 ENCOUNTER — APPOINTMENT (OUTPATIENT)
Dept: OTOLARYNGOLOGY | Facility: CLINIC | Age: 67
End: 2022-01-24
Payer: MEDICARE

## 2022-01-24 VITALS — HEIGHT: 65 IN | BODY MASS INDEX: 42.49 KG/M2 | WEIGHT: 255 LBS | TEMPERATURE: 97.2 F

## 2022-01-24 PROCEDURE — 99214 OFFICE O/P EST MOD 30 MIN: CPT | Mod: 25

## 2022-01-24 PROCEDURE — 31231 NASAL ENDOSCOPY DX: CPT

## 2022-01-24 NOTE — PHYSICAL EXAM
[FreeTextEntry1] : \par The patient was alert and oriented and in no distress.\par Voice was clear.\par \par Face:\par The patient had no facial asymmetry or mass.\par The skin was unremarkable.\par \par Eyes:\par The pupils were equal round and reactive to light and accommodation.\par She has the disconjugate gaze.\par \par Nose: \par The external nose had no significant deformity.  There was no facial tenderness.  On anterior rhinoscopy, the nasal mucosa was clear.  The anterior septum was midline.  There were no visualized polyps purulence  or masses.\par \par Oral cavity:\par The oral mucosa was normal.\par The oral and base of tongue were clear and without mass.\par The gingival and buccal mucosa were moist and without lesions.\par The palate moved well.\par There was no cleft to the palate.\par There appeared to be good salivary flow.  \par There was no pus, erythema or mass in the oral cavity.\par \par \par Ears:\par The external ears were normal without deformity.\par The ear canals were clear.\par The tympanic membranes were intact and normal.\par \par Neck: \par The neck was symmetrical.\par The parotid and submandibular glands were normal without masses.\par The trachea was midline and there was no unusual crepitus.\par The thyroid was smooth and nontender and no masses were palpated.\par There was no significant cervical adenopathy.\par \par \par Neuro:\par Neurologically, the patient was awake, alert, and oriented to person, place and time. There were no obvious focal neurologic abnormalities.  Cranial nerves II through XII were grossly intact.\par \par \par TMJ:\par The temporomandibular joints were nontender.\par There was no abnormal crepitus and no significant malocclusion\par \par  [de-identified] : Nasal endoscopy:\par \par Nasal endoscopy was done with topical anesthesia and a flexible endoscope to evaluate for nasal polyps, chronic sinusitis and response to therapy.\par Endocoscopy was performed to inspect the interior of the nasal cavity, the nasal septum,  the middle and superior meati, the inferior, middle and superior turbinates, and the spheno-ethmoidal  recesses, the nasopharynx and eustachian tube orifices bilaterally\par \par Endoscopy was done with Covid precautions and with video. All risks and benefits were discussed with the patient and consent obtained.\par \par \par Evaluation showed that the septum was midline.  There was no significant mucosal disease.  The inferior turbinates and middle turbinates were normal.  On both sides, the surgically opened ethmoids, antra, and frontal recesses were clear.  There was no evidence of polypoid recurrences and no purulence.  The mucosa was close to stage zero.  The nasopharynx was benign.    The middle and  superior meati were normal and the sphenoethmoidal recesses were without evidence of disease\par \par She had dry patches on the septum bilaterally and widely patent sinuses on the left. 360 evaluation showed no evidence of recurrent crusting or sinus infection

## 2022-01-24 NOTE — HISTORY OF PRESENT ILLNESS
[de-identified] : EDWIN JALLOH Was seen in followup on December 8. She has the ozenoid recurrent left-sided sinusitis. She has been doing well and has not had any significant purulence but is concerned because she is having surgery soon.  The patient had no other ear nose or throat complaints at this v and wants to be reevaluated.

## 2022-01-24 NOTE — CONSULT LETTER
[FreeTextEntry2] : MIKAEL DAMON\par  [FreeTextEntry1] : \par \par Dear  Dr. MIKAEL DAMON,\par \par I had the pleasure of seeing your patient today.  \par Please see my note below.\par \par \par Thank you very much for allowing me to participate in the care of your patient.\par \par Sincerely,\par \par \par Sidney Ley MD\par [facility]\par [facility]\par  [facility]\par \par

## 2022-02-11 ENCOUNTER — APPOINTMENT (OUTPATIENT)
Dept: OTOLARYNGOLOGY | Facility: CLINIC | Age: 67
End: 2022-02-11
Payer: MEDICARE

## 2022-02-11 PROCEDURE — 31231 NASAL ENDOSCOPY DX: CPT

## 2022-02-11 PROCEDURE — 99213 OFFICE O/P EST LOW 20 MIN: CPT | Mod: 25

## 2022-02-11 NOTE — CONSULT LETTER
[FreeTextEntry2] : MIKAEL DAMON\par  [FreeTextEntry1] : \par \par Dear  Dr. MIKAEL DAMON,\par \par I had the pleasure of seeing your patient today.  \par Please see my note below.\par \par \par Thank you very much for allowing me to participate in the care of your patient.\par \par Sincerely,\par \par \par Sidney Ley MD\par [facility]\par [facility]\par  [facility]\par \par

## 2022-02-11 NOTE — ASSESSMENT
[FreeTextEntry1] : It was my impression that she is doing well at this point. Other than for some dryness there is no evidence of recurrent sinusitis. I reassured her that she did not have a sinus infection as a likely cause of her elevated sugars.\par She is again complaining of a positional vertigo and I suggested going back to her vestibular therapist.  She will do this after her surgery.

## 2022-02-11 NOTE — REVIEW OF SYSTEMS
Date & Time: 7/27/2021, 7:33 AM  Patient: Selwyn Ca  Encounter Provider(s):    Luana Crenshaw MD       To Whom It May Concern:    Selwyn Ca was seen and treated in our department on 7/27/2021. She may return to work 07/31/2021.     If you hav [Negative] : Heme/Lymph

## 2022-02-11 NOTE — PHYSICAL EXAM
[FreeTextEntry1] : \par The patient was alert and oriented and in no distress.\par Voice was clear.\par \par Face:\par The patient had no facial asymmetry or mass.\par The skin was unremarkable.\par \par Eyes:\par The pupils were equal round and reactive to light and accommodation.\par She has the disconjugate gaze.\par \par Nose: \par The external nose had no significant deformity.  There was no facial tenderness.  On anterior rhinoscopy, the nasal mucosa was clear.  The anterior septum was midline.  There were no visualized polyps purulence  or masses.\par \par Oral cavity:\par The oral mucosa was normal.\par The oral and base of tongue were clear and without mass.\par The gingival and buccal mucosa were moist and without lesions.\par The palate moved well.\par There was no cleft to the palate.\par There appeared to be good salivary flow.  \par There was no pus, erythema or mass in the oral cavity.\par \par \par Ears:\par The external ears were normal without deformity.\par The ear canals were clear.\par The tympanic membranes were intact and normal.\par \par Neck: \par The neck was symmetrical.\par The parotid and submandibular glands were normal without masses.\par The trachea was midline and there was no unusual crepitus.\par The thyroid was smooth and nontender and no masses were palpated.\par There was no significant cervical adenopathy.\par \par \par Neuro:\par Neurologically, the patient was awake, alert, and oriented to person, place and time. There were no obvious focal neurologic abnormalities.  Cranial nerves II through XII were grossly intact.\par \par \par TMJ:\par The temporomandibular joints were nontender.\par There was no abnormal crepitus and no significant malocclusion\par \par  [de-identified] : Nasal endoscopy:\par \par Nasal endoscopy was done with topical anesthesia and a flexible endoscope to evaluate for nasal polyps, chronic sinusitis and response to therapy.\par Endocoscopy was performed to inspect the interior of the nasal cavity, the nasal septum,  the middle and superior meati, the inferior, middle and superior turbinates, and the spheno-ethmoidal  recesses, the nasopharynx and eustachian tube orifices bilaterally\par \par Endoscopy was done with Covid precautions and with video. All risks and benefits were discussed with the patient and consent obtained.\par \par \par Evaluation showed that the septum was midline.  There was no significant mucosal disease.  The inferior turbinates and middle turbinates were normal.  On both sides, the surgically opened ethmoids, antra, and frontal recesses were clear.  There was no evidence of polypoid recurrences and no purulence.  The mucosa was close to stage zero.  The nasopharynx was benign.    The middle and  superior meati were normal and the sphenoethmoidal recesses were without evidence of disease\par \par She had dry patches on the septum bilaterally and widely patent sinuses on the left. 360 evaluation showed no evidence of recurrent crusting or sinus infection

## 2022-02-11 NOTE — HISTORY OF PRESENT ILLNESS
[de-identified] : EDWIN JALLOH Was seen in followup on December 8. She has the ozenoid recurrent left-sided sinusitis. She has been doing well and has not had any significant purulence but is concerned because she is having surgery soon.  The patient had no other ear nose or throat complaints at this v and wants to be reevaluated. [FreeTextEntry1] : Her surgery had been put off until next week and so she made an appointment to check again before her surgery.  Her history is not not changed and she still has some dizziness.

## 2022-04-24 NOTE — ASSESSMENT
[FreeTextEntry1] : It was my impression that she is doing well at this point. Other than for some dryness there is no evidence of recurrent sinusitis. I reassured her as far as her upcoming surgery and would like to reevaluate in 6 weeks or earlier if the need arises. I recommend topical moisturizing up for the heating season
Speaking Coherently

## 2022-04-28 ENCOUNTER — APPOINTMENT (OUTPATIENT)
Dept: OTOLARYNGOLOGY | Facility: CLINIC | Age: 67
End: 2022-04-28
Payer: MEDICARE

## 2022-04-28 PROCEDURE — 99213 OFFICE O/P EST LOW 20 MIN: CPT | Mod: 25

## 2022-04-28 PROCEDURE — 31231 NASAL ENDOSCOPY DX: CPT

## 2022-04-28 NOTE — HISTORY OF PRESENT ILLNESS
[de-identified] : EDWIN JALLOH is a 66 year woman with a history of CRS and ozena. She recently had GYN surgery and has wound problem and is in rehab facility. She feels that she has nasal crusting.

## 2022-05-03 ENCOUNTER — APPOINTMENT (OUTPATIENT)
Dept: OTOLARYNGOLOGY | Facility: CLINIC | Age: 67
End: 2022-05-03
Payer: MEDICARE

## 2022-05-03 VITALS — HEIGHT: 65 IN | TEMPERATURE: 98.2 F | WEIGHT: 140 LBS | BODY MASS INDEX: 23.32 KG/M2

## 2022-05-03 DIAGNOSIS — L73.9 FOLLICULAR DISORDER, UNSPECIFIED: ICD-10-CM

## 2022-05-03 DIAGNOSIS — R42 DIZZINESS AND GIDDINESS: ICD-10-CM

## 2022-05-03 PROCEDURE — 99214 OFFICE O/P EST MOD 30 MIN: CPT | Mod: 25

## 2022-05-03 PROCEDURE — 31231 NASAL ENDOSCOPY DX: CPT

## 2022-05-09 LAB — EAR NOSE AND THROAT CULTURE: ABNORMAL

## 2022-05-09 NOTE — PHYSICAL EXAM
[FreeTextEntry1] : General:\par The patient was alert and oriented and in no distress.\par Voice was clear.\par \par Face:\par The patient had no facial asymmetry or mass.\par The skin was unremarkable.\par \par Ears:\par The external ears were normal without deformity.\par The ear canals were clear.\par The tympanic membranes were intact and normal.\par \par Oral cavity:\par The oral mucosa was normal.\par The oral and base of tongue were clear and without mass.\par The gingival and buccal mucosa were moist and without lesions.\par The palate moved well.\par There was no cleft to the palate.\par There appeared to be good salivary flow.  \par There was no pus, erythema or mass in the oral cavity.\par \par Neck: \par The neck was symmetrical.\par The parotid and submandibular glands were normal without masses.\par The trachea was midline and there was no unusual crepitus.\par The thyroid was smooth and nontender and no masses were palpated.\par There was no significant cervical adenopathy.\par \par Neuro:\par Neurologically, the patient was awake, alert, and oriented to person, place and time. There were no obvious focal neurologic abnormalities.  Cranial nerves II through XII were grossly intact. [de-identified] : Nasal endoscopy: \par CPT 68141\par Procedure Note:\par \par Endoscopy was done with Covid precautions and with video. All risks and benefits were discussed with the patient and consent obtained.\par \par Nasal endoscopy was done with topical anesthesia of Pontocaine and Afrin and a      nasal endoscope.\par Indication: Nasal congestion, rule out sinusitis.\par Procedure: The nasal cavity was anesthetized with topical Afrin and Pontocaine. An  endoscope was used and inserted into the nasal cavity.\par Attention was first paid to the anterior nasal cavity.\par Endocoscopy was performed to inspect the interior of the nasal cavity, the nasal septum,  the middle and superior meati, the inferior, middle and superior turbinates, and the spheno-ethmoidal  recesses, the nasopharynx and eustachian tube orifices bilaterally. \par All findings were normal except:\par \par She has the right septal deflection but the middle meati are patent.\par On the left, she has an area of inflammation without abscess along the left upper lateral cartilage lining.  However, the left middle meatus is patent and she does not have recurrent debris.  A culture was taken of the left nasal vestibule

## 2022-05-09 NOTE — HISTORY OF PRESENT ILLNESS
[de-identified] : EDWIN JALLOH was seen in follow-up on May 3.  She had seen Dr. Baugh last week and since then she has developed a painful sore in her left nostril. She has the recurrent ozone oil left-sided sinusitis with crusting and purulence.  She is in a rehab facility now after a dehiscence from her GYN surgery.  The patient had no other ear nose or throat complaints at this visit.

## 2022-05-09 NOTE — ASSESSMENT
[FreeTextEntry1] : It was my impression that she is doing well as far as her arytenoid changes.  She still has the dizziness but this is not bothering her much.  She is in rehab after her GYN surgery for dehiscence.\par The discomfort she has at this point looks like a folliculitis.  I recommended Bactroban topically, Bactrim, pending her culture results and Diflucan at her request.  She has the past history of multiple episodes of MRSA that was sensitive to Bactrim.\par I recommended warm moist compresses and would like to reevaluate in 2 to 3 weeks to make sure that this has responded.

## 2022-05-09 NOTE — CONSULT LETTER
[FreeTextEntry2] : MIKAEL DAMON\par  [FreeTextEntry1] : \par \par Dear  Dr. MIKAEL DAMON,\par \par I had the pleasure of seeing your patient today.  \par Please see my note below.\par \par \par Thank you very much for allowing me to participate in the care of your patient.\par \par Sincerely,\par \par \par Sidney Ley MD\par [facility]\par [facility]\par  [facility]\par \par

## 2022-05-17 ENCOUNTER — APPOINTMENT (OUTPATIENT)
Dept: OTOLARYNGOLOGY | Facility: CLINIC | Age: 67
End: 2022-05-17

## 2022-06-28 ENCOUNTER — APPOINTMENT (OUTPATIENT)
Dept: OTOLARYNGOLOGY | Facility: CLINIC | Age: 67
End: 2022-06-28

## 2022-06-28 VITALS — WEIGHT: 230 LBS | HEIGHT: 65 IN | BODY MASS INDEX: 38.32 KG/M2 | TEMPERATURE: 96.6 F

## 2022-06-28 DIAGNOSIS — R42 DIZZINESS AND GIDDINESS: ICD-10-CM

## 2022-06-28 PROCEDURE — 99213 OFFICE O/P EST LOW 20 MIN: CPT | Mod: 25

## 2022-06-28 PROCEDURE — 31237 NSL/SINS NDSC SURG BX POLYPC: CPT

## 2022-06-28 RX ORDER — FLUCONAZOLE 200 MG/1
200 TABLET ORAL DAILY
Qty: 3 | Refills: 1 | Status: DISCONTINUED | COMMUNITY
Start: 2022-01-24 | End: 2022-06-28

## 2022-06-28 RX ORDER — CLINDAMYCIN HYDROCHLORIDE 300 MG/1
300 CAPSULE ORAL 3 TIMES DAILY
Qty: 30 | Refills: 0 | Status: DISCONTINUED | COMMUNITY
Start: 2021-07-14 | End: 2022-06-28

## 2022-06-28 RX ORDER — FLUCONAZOLE 200 MG/1
200 TABLET ORAL DAILY
Qty: 3 | Refills: 1 | Status: DISCONTINUED | COMMUNITY
Start: 2021-12-23 | End: 2022-06-28

## 2022-06-28 RX ORDER — SULFAMETHOXAZOLE AND TRIMETHOPRIM 800; 160 MG/1; MG/1
800-160 TABLET ORAL
Qty: 20 | Refills: 0 | Status: DISCONTINUED | COMMUNITY
Start: 2022-05-03 | End: 2022-06-28

## 2022-06-28 RX ORDER — FLUCONAZOLE 200 MG/1
200 TABLET ORAL DAILY
Qty: 5 | Refills: 0 | Status: DISCONTINUED | COMMUNITY
Start: 2022-05-03 | End: 2022-06-28

## 2022-06-28 NOTE — REVIEW OF SYSTEMS
[As Noted in HPI] : as noted in HPI [Recurrent Sinus Infections] : recurrent sinus infections [Negative] : Heme/Lymph

## 2022-07-01 NOTE — HISTORY OF PRESENT ILLNESS
[de-identified] : EDWIN JALLOH was seen on June 28.  She had been discharged after her wound care.  She has noticed a faint malodor from the left nasal cavities.  She has a history of ozena and recurrent sinus infections.  The patient had no other ear nose or throat complaints at this visit.

## 2022-07-01 NOTE — CONSULT LETTER
[FreeTextEntry2] : MIKAEL DAMON\par  [FreeTextEntry1] : \par \par Dear  Dr. MIKAEL DAMON,\par \par I had the pleasure of seeing your patient today.  \par Please see my note below.\par \par \par Thank you very much for allowing me to participate in the care of your patient.\par \par Sincerely,\par \par \par Sidney Ley MD\par [facility]\par [facility]\par  [facility]\par \par

## 2022-07-01 NOTE — REASON FOR VISIT
[Subsequent Evaluation] : a subsequent evaluation for [Sinusitis] : sinusitis [FreeTextEntry2] : suspected sinus infection

## 2022-07-01 NOTE — PHYSICAL EXAM
[FreeTextEntry1] : General:\par The patient was alert and oriented and in no distress.\par Voice was clear.\par \par Face:\par The patient had no facial asymmetry or mass.\par The skin was unremarkable.  She has a disconjugate gaze.\par \par Ears:\par The external ears were normal without deformity.\par The ear canals were clear.\par The tympanic membranes were intact and normal.\par \par Oral cavity:\par The oral mucosa was normal.\par The oral and base of tongue were clear and without mass.\par The gingival and buccal mucosa were moist and without lesions.\par The palate moved well.\par There was no cleft to the palate.\par There appeared to be good salivary flow.  \par There was no pus, erythema or mass in the oral cavity.\par \par Neuro:\par Neurologically, the patient was awake, alert, and oriented to person, place and time. There were no obvious focal neurologic abnormalities.  Cranial nerves II through XII were grossly intact.\par \par Neck: \par The neck was symmetrical.\par The parotid and submandibular glands were normal without masses.\par The trachea was midline and there was no unusual crepitus.\par The thyroid was smooth and nontender and no masses were palpated.\par There was no significant cervical adenopathy. [de-identified] : \par Nasal surgical endoscopy: \par CPT 85995\par Procedure Note:\par \par Endoscopy was done with Covid precautions and with video. All risks and benefits were discussed with the patient and consent obtained.\par \par Nasal endoscopy was done with topical anesthesia of Pontocaine and Afrin and a      nasal endoscope.\par Indication: Nasal congestion, rule out sinusitis.\par Procedure: The nasal cavity was anesthetized with topical Afrin and Pontocaine. An  endoscope was used and inserted into the nasal cavity.\par Attention was first paid to the anterior nasal cavity.\par Endocoscopy was performed to inspect the interior of the nasal cavity, the nasal septum,  the middle and superior meati, the inferior, middle and superior turbinates, and the spheno-ethmoidal  recesses, the nasopharynx and eustachian tube orifices bilaterally. \par All findings were normal except:\par \par On the left side, there was a large green crusting obstructing the maxillary sinus drainage.  With a forward biting forceps this was removed.  This was then cultured and beyond that the left antrum was suction but just of scant purulent debris

## 2022-07-01 NOTE — PHYSICAL EXAM
[FreeTextEntry1] : General:\par The patient was alert and oriented and in no distress.\par Voice was clear.\par \par Face:\par The patient had no facial asymmetry or mass.\par The skin was unremarkable.  She has a disconjugate gaze.\par \par Ears:\par The external ears were normal without deformity.\par The ear canals were clear.\par The tympanic membranes were intact and normal.\par \par Oral cavity:\par The oral mucosa was normal.\par The oral and base of tongue were clear and without mass.\par The gingival and buccal mucosa were moist and without lesions.\par The palate moved well.\par There was no cleft to the palate.\par There appeared to be good salivary flow.  \par There was no pus, erythema or mass in the oral cavity.\par \par Neuro:\par Neurologically, the patient was awake, alert, and oriented to person, place and time. There were no obvious focal neurologic abnormalities.  Cranial nerves II through XII were grossly intact.\par \par Neck: \par The neck was symmetrical.\par The parotid and submandibular glands were normal without masses.\par The trachea was midline and there was no unusual crepitus.\par The thyroid was smooth and nontender and no masses were palpated.\par There was no significant cervical adenopathy. [de-identified] : \par Nasal surgical endoscopy: \par CPT 40182\par Procedure Note:\par \par Endoscopy was done with Covid precautions and with video. All risks and benefits were discussed with the patient and consent obtained.\par \par Nasal endoscopy was done with topical anesthesia of Pontocaine and Afrin and a      nasal endoscope.\par Indication: Nasal congestion, rule out sinusitis.\par Procedure: The nasal cavity was anesthetized with topical Afrin and Pontocaine. An  endoscope was used and inserted into the nasal cavity.\par Attention was first paid to the anterior nasal cavity.\par Endocoscopy was performed to inspect the interior of the nasal cavity, the nasal septum,  the middle and superior meati, the inferior, middle and superior turbinates, and the spheno-ethmoidal  recesses, the nasopharynx and eustachian tube orifices bilaterally. \par All findings were normal except:\par \par On the left side, there was a large green crusting obstructing the maxillary sinus drainage.  With a forward biting forceps this was removed.  This was then cultured and beyond that the left antrum was suction but just of scant purulent debris

## 2022-07-01 NOTE — HISTORY OF PRESENT ILLNESS
[de-identified] : EDWIN JALLOH was seen on June 28.  She had been discharged after her wound care.  She has noticed a faint malodor from the left nasal cavities.  She has a history of ozena and recurrent sinus infections.  The patient had no other ear nose or throat complaints at this visit.

## 2022-07-01 NOTE — ASSESSMENT
[FreeTextEntry1] : It was my impression that she had the purulent debris causing the odor behind the significant crusting.  This was debrided.  Her most recent culture was methicillin sensitive staph but she says she is taking something for her of foot wound.  At this point, I suggested going back to mupirocin rinses and hopefully that will resolve.  If not, I would not add an oral antibiotic depending on her culture.  Lastly, I would like to reevaluate in 6 weeks or as needed.

## 2022-07-01 NOTE — CONSULT LETTER
[FreeTextEntry2] : MIKAEL DAMON\par  [FreeTextEntry1] : \par \par Dear  Dr. MIKAEL DAMON,\par \par I had the pleasure of seeing your patient today.  \par Please see my note below.\par \par \par Thank you very much for allowing me to participate in the care of your patient.\par \par Sincerely,\par \par \par Sidney Ley MD\par [facility]\par [facility]\par  [facility]\par \par

## 2022-07-05 LAB — EAR NOSE AND THROAT CULTURE: ABNORMAL

## 2022-07-10 ENCOUNTER — RX RENEWAL (OUTPATIENT)
Age: 67
End: 2022-07-10

## 2022-07-10 RX ORDER — MUPIROCIN 20 MG/G
2 OINTMENT TOPICAL
Qty: 66 | Refills: 13 | Status: ACTIVE | COMMUNITY
Start: 2020-02-19 | End: 1900-01-01

## 2022-08-02 ENCOUNTER — APPOINTMENT (OUTPATIENT)
Dept: OTOLARYNGOLOGY | Facility: CLINIC | Age: 67
End: 2022-08-02

## 2022-08-02 VITALS — TEMPERATURE: 97.3 F | BODY MASS INDEX: 42.49 KG/M2 | HEIGHT: 65 IN | WEIGHT: 255 LBS

## 2022-08-02 PROCEDURE — 99213 OFFICE O/P EST LOW 20 MIN: CPT | Mod: 25

## 2022-08-02 PROCEDURE — 31231 NASAL ENDOSCOPY DX: CPT

## 2022-08-02 NOTE — PHYSICAL EXAM
[de-identified] : General:\par The patient was alert and oriented and in no distress.\par Voice was clear.\par \par Face:\par The patient had no facial asymmetry or mass.\par The skin was unremarkable.  She has a disconjugate gaze\par \par Ears:\par The external ears were normal without deformity.\par The ear canals were clear.\par The tympanic membranes were intact and normal.\par \par Oral cavity:\par The oral mucosa was normal.\par The oral and base of tongue were clear and without mass.\par The gingival and buccal mucosa were moist and without lesions.\par The palate moved well.\par There was no cleft to the palate.\par There appeared to be good salivary flow.  \par There was no pus, erythema or mass in the oral cavity.\par \par Neuro:\par Neurologically, the patient was awake, alert, and oriented to person, place and time. There were no obvious focal neurologic abnormalities.  Cranial nerves II through XII were grossly intact.\par \par Nasal endoscopy: \par \par Endoscopy was done with Covid precautions and with video. All risks and benefits were discussed with the patient and consent obtained.\par \par Procedure Note:\par \par Nasal endoscopy was done with topical anesthesia and a fiberoptic endoscope.\par Indication: Nasal congestion, rule out sinusitis.\par Procedure: The nasal cavity was anesthetized with topical Afrin and Pontocaine. An  endoscope was used and inserted into the nasal cavity. \par Endocoscopy was performed to inspect the interior of the nasal cavity, the nasal septum,  the middle and superior meati, the inferior, middle and superior turbinates, and the spheno-ethmoidal  recesses, the nasopharynx and eustachian tube orifices bilaterally\par  This showed that the nasal mucosa was slightly boggy.  There was a moderate S-shaped septal deflection.  However, the middle meati were open.  There were no polyps, purulence or masses.  The eustachian tube orifices were clear.  The nasopharynx was benign and without mass.  The inferior and middle turbinates were slightly boggy, the superior meati were normal and the sphenoethmoidal recesses were without evidence of disease.\par On the right side, the middle meatus was open mucosa slightly boggy and without polyps purulence or masses.  On the left side there was a scant amount of yellow-green discharge coming out of the antrum but the crusting had responded.

## 2022-08-02 NOTE — ASSESSMENT
[FreeTextEntry1] : It was my impression that she has the persistent was annoyed maxillary and partial ethmoid sinusitis.  Her culture last time was methicillin sensitive staph and she is using the Bactroban rinses.  She preferred not to use an oral antibiotic and at this point I recommended continuing on her current regimen.\par \par She has the sensorineural hearing losses and it has been a while since her last hearing test but she declined repeat audiometry\par \par I suggested follow-up in 6 weeks or as needed.

## 2022-08-02 NOTE — HISTORY OF PRESENT ILLNESS
[de-identified] : EDWIN JALLOH was seen in follow-up on August 2.  She notes she got a large crust out of the left maxillary sinus and was feeling better.  She is using mupirocin rinsing and this seems to be helping.  She has the history of ozena and recurrent sinusitis.  Dragon noncontributory

## 2022-08-02 NOTE — CONSULT LETTER
[FreeTextEntry2] : MIKAEL DAMON\par  [FreeTextEntry1] : \par \par Dear  Dr. MIKAEL DAMON,\par \par I had the pleasure of seeing your patient today.  \par Please see my note below.\par \par \par Thank you very much for allowing me to participate in the care of your patient.\par \par Sincerely,\par \par \par Sidney Ley MD\par [facility]\par [facility]\par  [facility]\par \par

## 2022-09-13 ENCOUNTER — APPOINTMENT (OUTPATIENT)
Dept: OTOLARYNGOLOGY | Facility: CLINIC | Age: 67
End: 2022-09-13

## 2022-09-13 VITALS — TEMPERATURE: 98.3 F | HEIGHT: 65 IN | BODY MASS INDEX: 42.49 KG/M2 | WEIGHT: 255 LBS

## 2022-09-13 DIAGNOSIS — H93.12 TINNITUS, LEFT EAR: ICD-10-CM

## 2022-09-13 PROCEDURE — 31237 NSL/SINS NDSC SURG BX POLYPC: CPT

## 2022-09-13 PROCEDURE — 99213 OFFICE O/P EST LOW 20 MIN: CPT | Mod: 25

## 2022-09-13 NOTE — HISTORY OF PRESENT ILLNESS
[de-identified] : EDWIN JALLOH is a 67 year old female who was seen in follow-up on September 13.  She had foul-smelling discharge and crusting that improved after her debridement and mupirocin rinses.  She feels it was better but now she thinks there may be some discharge again.  This is always been left-sided and she has the ozenoid changes.

## 2022-09-13 NOTE — ASSESSMENT
[FreeTextEntry1] : It was my impression that she again had the crusting with secondary infection from the obstruction to the left maxillary sinus from her ozena.  This was debrided.  Topical antibiotics in the antrum had not really helped her in the past although the mupirocin rinses had.  At this point I suggested going back to the mupirocin rinses regularly and would like to reevaluate in 3 to 4 weeks to make sure this has responded.  If not I would plan oral antibiotics as well.

## 2022-09-13 NOTE — PHYSICAL EXAM
[de-identified] : General:\par The patient was alert and oriented and in no distress.\par Voice was clear.\par \par Face:\par The patient had no facial asymmetry or mass.\par The skin was unremarkable.  She has a disconjugate gaze\par \par Ears:\par The external ears were normal without deformity.\par The ear canals were clear.\par The tympanic membranes were intact and normal.\par \par Oral cavity:\par The oral mucosa was normal.\par The oral and base of tongue were clear and without mass.\par The gingival and buccal mucosa were moist and without lesions.\par The palate moved well.\par There was no cleft to the palate.\par There appeared to be good salivary flow.  \par There was no pus, erythema or mass in the oral cavity.\par \par Neuro:\par Neurologically, the patient was awake, alert, and oriented to person, place and time. There were no obvious focal neurologic abnormalities.  Cranial nerves II through XII were grossly intact.\par \par Nasal endoscopy: for debridement [id number]\par \par Endoscopy was done with Covid precautions and with video. All risks and benefits were discussed with the patient and consent obtained.\par \par Procedure Note:\par \par Nasal endoscopy was done with topical anesthesia and a fiberoptic endoscope.\par Indication: Nasal congestion, rule out sinusitis.\par Procedure: The nasal cavity was anesthetized with topical Afrin and Pontocaine. An  endoscope was used and inserted into the nasal cavity. \par Endocoscopy was performed to inspect the interior of the nasal cavity, the nasal septum,  the middle and superior meati, the inferior, middle and superior turbinates, and the spheno-ethmoidal  recesses, the nasopharynx and eustachian tube orifices bilaterally\par  This showed that the nasal mucosa was slightly boggy.  There was a moderate S-shaped septal deflection.  However, the middle meati were open.  There were no polyps, purulence or masses.  The eustachian tube orifices were clear.  The nasopharynx was benign and without mass.  The inferior and middle turbinates were slightly boggy, the superior meati were normal and the sphenoethmoidal recesses were without evidence of disease.\par On the right side, the middle meatus was open mucosa slightly boggy and without polyps purulence or masses.  On the left side there hard large yellow-green crusting obstructing the left middle meatus.  With a pediatric 30 degree scope and straight and curved suction and up-biting forceps the crust was removed.  Beyond this, there was a scant amount of yellow-green discharge coming out of the antrum but the crusting had responded.

## 2022-09-13 NOTE — CONSULT LETTER
[FreeTextEntry2] : MIKAEL DAMON\par  [FreeTextEntry1] : \par \par Dear  Dr. MIKAEL DAMON,\par \par I had the pleasure of seeing your patient today.  \par Please see my note below.\par \par \par Thank you very much for allowing me to participate in the care of your patient.\par \par Sincerely,\par \par \par Sidney Ley MD\par [facility]\par [facility]\par  [facility]\par \par

## 2022-10-11 ENCOUNTER — APPOINTMENT (OUTPATIENT)
Dept: OTOLARYNGOLOGY | Facility: CLINIC | Age: 67
End: 2022-10-11

## 2022-11-03 ENCOUNTER — APPOINTMENT (OUTPATIENT)
Dept: OTOLARYNGOLOGY | Facility: CLINIC | Age: 67
End: 2022-11-03

## 2022-11-03 VITALS — WEIGHT: 255 LBS | BODY MASS INDEX: 42.49 KG/M2 | HEIGHT: 65 IN | TEMPERATURE: 97 F

## 2022-11-03 PROCEDURE — 99214 OFFICE O/P EST MOD 30 MIN: CPT | Mod: 25

## 2022-11-03 PROCEDURE — 69210 REMOVE IMPACTED EAR WAX UNI: CPT

## 2022-11-03 PROCEDURE — 31231 NASAL ENDOSCOPY DX: CPT

## 2022-11-03 RX ORDER — CEPHALEXIN 500 MG/1
500 CAPSULE ORAL
Qty: 14 | Refills: 0 | Status: ACTIVE | COMMUNITY
Start: 2022-06-27

## 2022-11-03 RX ORDER — OXYCODONE AND ACETAMINOPHEN 10; 325 MG/1; MG/1
10-325 TABLET ORAL
Qty: 100 | Refills: 0 | Status: ACTIVE | COMMUNITY
Start: 2022-09-09

## 2022-11-03 RX ORDER — FLUCONAZOLE 150 MG/1
150 TABLET ORAL
Qty: 3 | Refills: 0 | Status: ACTIVE | COMMUNITY
Start: 2021-09-07

## 2022-11-03 RX ORDER — CYCLOSPORINE 0.5 MG/ML
0.05 EMULSION OPHTHALMIC
Qty: 180 | Refills: 0 | Status: ACTIVE | COMMUNITY
Start: 2022-05-24

## 2022-11-03 RX ORDER — TRAMADOL HYDROCHLORIDE 50 MG/1
50 TABLET, COATED ORAL
Qty: 180 | Refills: 0 | Status: ACTIVE | COMMUNITY
Start: 2022-07-08

## 2022-11-03 RX ORDER — INSULIN GLARGINE 300 U/ML
300 INJECTION, SOLUTION SUBCUTANEOUS
Qty: 4 | Refills: 0 | Status: ACTIVE | COMMUNITY
Start: 2022-07-07

## 2022-11-03 RX ORDER — NITROFURANTOIN (MONOHYDRATE/MACROCRYSTALS) 25; 75 MG/1; MG/1
100 CAPSULE ORAL
Qty: 90 | Refills: 0 | Status: ACTIVE | COMMUNITY
Start: 2022-08-10

## 2022-11-03 RX ORDER — GENTAMICIN SULFATE 1 MG/G
0.1 CREAM TOPICAL
Qty: 15 | Refills: 0 | Status: ACTIVE | COMMUNITY
Start: 2022-06-27

## 2022-11-03 RX ORDER — VALSARTAN AND HYDROCHLOROTHIAZIDE 160; 25 MG/1; MG/1
160-25 TABLET, FILM COATED ORAL
Qty: 90 | Refills: 0 | Status: ACTIVE | COMMUNITY
Start: 2021-10-12

## 2022-11-03 RX ORDER — PEN NEEDLE, DIABETIC 29 G X1/2"
32G X 4 MM NEEDLE, DISPOSABLE MISCELLANEOUS
Qty: 100 | Refills: 0 | Status: ACTIVE | COMMUNITY
Start: 2022-07-07

## 2022-11-03 RX ORDER — MOMETASONE FUROATE 1 MG/G
0.1 OINTMENT TOPICAL
Qty: 60 | Refills: 0 | Status: ACTIVE | COMMUNITY
Start: 2022-09-29

## 2022-11-03 RX ORDER — GABAPENTIN 300 MG/1
300 CAPSULE ORAL
Qty: 30 | Refills: 0 | Status: ACTIVE | COMMUNITY
Start: 2022-09-12

## 2022-11-03 RX ORDER — CLOTRIMAZOLE AND BETAMETHASONE DIPROPIONATE 10; .5 MG/G; MG/G
1-0.05 CREAM TOPICAL
Qty: 45 | Refills: 0 | Status: ACTIVE | COMMUNITY
Start: 2022-08-19

## 2022-11-03 NOTE — ASSESSMENT
[FreeTextEntry1] : EDWIN JALLOH appears to be having mild exacerbation of left TMJ/cervical spasm. I suggested warm compresses and massage to left trapezius trigger point. She will discuss vestibular therapy with Dr. Ley.

## 2022-11-03 NOTE — HISTORY OF PRESENT ILLNESS
[de-identified] : EDWIN JALLOH is a 67 year woman with a history of CRS. She is having hand surgery soon. Her left ear feels mildly clogged. She has longstanding positional vertigo.

## 2022-12-07 ENCOUNTER — APPOINTMENT (OUTPATIENT)
Dept: OTOLARYNGOLOGY | Facility: CLINIC | Age: 67
End: 2022-12-07

## 2022-12-22 ENCOUNTER — APPOINTMENT (OUTPATIENT)
Dept: OTOLARYNGOLOGY | Facility: CLINIC | Age: 67
End: 2022-12-22

## 2022-12-22 VITALS — TEMPERATURE: 96 F | HEIGHT: 65 IN | WEIGHT: 255 LBS | BODY MASS INDEX: 42.49 KG/M2

## 2022-12-22 PROCEDURE — 99213 OFFICE O/P EST LOW 20 MIN: CPT | Mod: 25

## 2022-12-22 PROCEDURE — 31231 NASAL ENDOSCOPY DX: CPT

## 2022-12-22 NOTE — HISTORY OF PRESENT ILLNESS
[de-identified] : EDWIN JALLOH is a 67 year woman with a history of CRS who ahs ahd several days of foul odor on the left.

## 2022-12-22 NOTE — REVIEW OF SYSTEMS
[Patient Intake Form Reviewed] : Patient intake form was reviewed [de-identified] : smell in the nose

## 2022-12-28 LAB — EAR NOSE AND THROAT CULTURE: ABNORMAL

## 2023-02-06 ENCOUNTER — APPOINTMENT (OUTPATIENT)
Dept: ORTHOPEDIC SURGERY | Facility: CLINIC | Age: 68
End: 2023-02-06
Payer: MEDICARE

## 2023-02-06 VITALS — HEIGHT: 65 IN | WEIGHT: 255 LBS | BODY MASS INDEX: 42.49 KG/M2

## 2023-02-06 DIAGNOSIS — M17.12 UNILATERAL PRIMARY OSTEOARTHRITIS, LEFT KNEE: ICD-10-CM

## 2023-02-06 PROCEDURE — 73562 X-RAY EXAM OF KNEE 3: CPT | Mod: 50

## 2023-02-06 PROCEDURE — 99213 OFFICE O/P EST LOW 20 MIN: CPT

## 2023-02-06 RX ORDER — CELECOXIB 200 MG/1
200 CAPSULE ORAL
Qty: 30 | Refills: 2 | Status: ACTIVE | COMMUNITY
Start: 2023-02-06 | End: 1900-01-01

## 2023-02-06 NOTE — PHYSICAL EXAM
[de-identified] : Right knee there is definite warmth on the anterolateral aspect of the knee there is also tenderness and warmth to the medial joint line.  Range of motion 0 to 125 degrees knee stable to prescription with valgus dressing and full extension and 90 degrees of flexion.  There are some moderate tenderness with compression of the lateral patellofemoral facet.  Extensor mechanism is intact [de-identified] : Knee radiographs were ordered today.  AP standing individual ultrasonography was obtained showing moderate diminishment of medial joint space on the standing AP projection with secondary early findings of arthritis such as early osteophyte and cyst formation mild varus inflammation.  No evidence of fracture or bony injury with particular attention paid to the patella as well as lateral  tibial plateau.

## 2023-02-06 NOTE — DISCUSSION/SUMMARY
[de-identified] : Patient I discussed the underlying etiology of her right knee pain.  Patient has suffered a contusion superimposed on low level chronic osteoarthritis.  Patient I discussed how this is usually self-limiting although it could take another 6 to 8 weeks before she is completely symptom-free.  Patient and I discussed also conservative measures to help improve her rate of recovery.  Patient has agreed to ice the knee twice a day for the upcoming 5 days she was also placed on Celebrex 200 mg p.o. twice daily for a 5-day course then to be taken thereafter as needed.  Reason risk benefits of medication were discussed in detail including potential side effects.  We reviewed her current medication profile and appears to be no relative contraindication to its limited intermittent use.\par \par Patient will monitor symptoms she will follow-up in 10 days if not improved at that point may consider cortisone injection.  Cortisone injection was deferred today because the patient's history of non-insulin-dependent diabetes.\par \par Today's consultation lasted 42 minutes.

## 2023-02-06 NOTE — HISTORY OF PRESENT ILLNESS
[de-identified] : First-time visit for this 67-year-old female she is here because of a fall she sustained a few days ago onto the right knee.  Patient having difficulty with stair climbing had a return to use of a cane.  She also has difficulty getting in and out of seated positions.  Patient is here to have the right knee evaluated.

## 2023-02-06 NOTE — REASON FOR VISIT
[Initial Visit] : an initial visit for [Knee Pain] : knee pain [FreeTextEntry2] : Rt knee pain. Pt fell in her house over the weekend and injured the knee.

## 2023-02-17 ENCOUNTER — APPOINTMENT (OUTPATIENT)
Dept: ORTHOPEDIC SURGERY | Facility: CLINIC | Age: 68
End: 2023-02-17

## 2023-02-21 ENCOUNTER — APPOINTMENT (OUTPATIENT)
Dept: ORTHOPEDIC SURGERY | Facility: CLINIC | Age: 68
End: 2023-02-21
Payer: MEDICARE

## 2023-02-21 DIAGNOSIS — M17.11 UNILATERAL PRIMARY OSTEOARTHRITIS, RIGHT KNEE: ICD-10-CM

## 2023-02-21 PROCEDURE — 20611 DRAIN/INJ JOINT/BURSA W/US: CPT | Mod: RT

## 2023-02-21 PROCEDURE — 99215 OFFICE O/P EST HI 40 MIN: CPT | Mod: 25

## 2023-02-21 NOTE — REASON FOR VISIT
[Follow-Up Visit] : a follow-up visit for [Knee Pain] : knee pain [FreeTextEntry2] : Follow up for Rt knee pain.

## 2023-02-21 NOTE — HISTORY OF PRESENT ILLNESS
[de-identified] : Patient returns today she was seen 2 weeks ago complaining of right knee pain resumption especially with stairs.  Patient has underlying known diagnosis of moderate osteoarthritis of the right knee.  Patient states she took the Celebrex that she was prescribed intermittently felt some improvement but still has pain she is here for further treatment options.

## 2023-02-21 NOTE — PROCEDURE
[de-identified] : LIDOCAINE\par HIKMA FARMACEUTICA\par NDC 2652-4378-84\par LOT #[id number]\par EXP 01/2024\par 1% 500MG/50ML\par \par KENALOG-10\par [employer]\par NDC 5621-8502-77\par LOT #[id number]\par EXP apr 2023\par 50MG/5ML\par \par \par Patient was given a cortisone injection (Lidocaine 1% 4 cc + 10 mg of Kenalog) in the lateral compartment of the right knee. Injection is performed under sterile conditions with ultrasound guidance. Patient tolerated procedure well. If patient has a history of insulin- dependant diabetes they were informed of possible increase of blood glucose levels and instructed to adjust insulin accordingly.\par \par

## 2023-02-21 NOTE — PHYSICAL EXAM
[de-identified] : Right knee there is definite warmth on the anterolateral aspect of the knee there is also tenderness and warmth to the medial joint line.  Range of motion 0 to 125 degrees knee stable to prescription with valgus dressing and full extension and 90 degrees of flexion.  There are some moderate tenderness with compression of the lateral patellofemoral facet.  Extensor mechanism is intact

## 2023-02-21 NOTE — DISCUSSION/SUMMARY
[de-identified] : Patient I reviewed the underlying etiology of her right knee pain.  She has known diagnosis of advanced osteoarthritis of all 3 compartments most notably in the lateral.  Patient was given a cortisone injection today.  She will monitor her symptoms she will also monitor her blood glucose levels at home as she is used to doing.  She will adjust her medications appropriately.  We also talked at length about the need to consider a plan for weight/BMI reduction.  Currently patient's BMI is 42 I will if she may need to consider knee replacement surgery in the short-term BMI/weight reduction will help reduce both current symptoms and also help reduce perioperative complications at time of proposed surgery.\par \par Patient will ice the knee here today in the office again tonight if symptomatic follow-up as needed.\par \par Today's consultation lasted 42 minutes.

## 2023-03-15 ENCOUNTER — APPOINTMENT (OUTPATIENT)
Dept: OTOLARYNGOLOGY | Facility: CLINIC | Age: 68
End: 2023-03-15
Payer: MEDICARE

## 2023-03-15 VITALS — BODY MASS INDEX: 41.65 KG/M2 | HEIGHT: 65 IN | WEIGHT: 250 LBS

## 2023-03-15 PROCEDURE — 31237 NSL/SINS NDSC SURG BX POLYPC: CPT

## 2023-03-15 PROCEDURE — 99213 OFFICE O/P EST LOW 20 MIN: CPT | Mod: 25

## 2023-03-15 RX ORDER — SULFAMETHOXAZOLE AND TRIMETHOPRIM 800; 160 MG/1; MG/1
800-160 TABLET ORAL
Qty: 20 | Refills: 0 | Status: ACTIVE | COMMUNITY
Start: 2023-03-15 | End: 1900-01-01

## 2023-03-15 RX ORDER — MUPIROCIN 20 MG/G
2 OINTMENT TOPICAL
Qty: 1 | Refills: 3 | Status: ACTIVE | COMMUNITY
Start: 2023-03-15 | End: 1900-01-01

## 2023-03-15 RX ORDER — FLUCONAZOLE 200 MG/1
200 TABLET ORAL DAILY
Qty: 3 | Refills: 1 | Status: ACTIVE | COMMUNITY
Start: 2023-03-15 | End: 1900-01-01

## 2023-03-21 LAB — EAR NOSE AND THROAT CULTURE: ABNORMAL

## 2023-03-21 NOTE — CONSULT LETTER
[FreeTextEntry2] : MIKAEL DAMON\par  [FreeTextEntry1] : \par \par Dear  Dr. MKIAEL DAMON,\par \par I had the pleasure of seeing your patient today.  \par Please see my note below.\par \par \par Thank you very much for allowing me to participate in the care of your patient.\par \par Sincerely,\par \par \par Sidney Ley MD\par [facility]\par [facility]\par  [facility]\par \par

## 2023-03-21 NOTE — PHYSICAL EXAM
[FreeTextEntry1] : General:\par The patient was alert and oriented and in no distress.\par Voice was clear.\par \par Face:\par The patient had no facial asymmetry or mass.\par The skin was unremarkable.  She has a disconjugate gaze\par \par Ears:\par The external ears were normal without deformity.\par The ear canals were clear.\par The tympanic membranes were intact and normal.\par \par Nasal endoscopy: \par \par Procedure Note:\par \par Endoscopy was done with Covid precautions and with video. All risks and benefits were discussed with the patient and consent obtained.\par \par Nasal endoscopy was done with topical anesthesia of Pontocaine and Afrin and a      nasal endoscope.\par Indication: Nasal congestion, rule out sinusitis.\par Procedure: The nasal cavity was anesthetized with topical Afrin and Pontocaine. An  endoscope was used and inserted into the nasal cavity.\par Attention was first paid to the anterior nasal cavity.\par Endocoscopy was performed to inspect the interior of the nasal cavity, the nasal septum,  the middle and superior meati, the inferior, middle and superior turbinates, and the spheno-ethmoidal  recesses, the nasopharynx and eustachian tube orifices bilaterally. including the nasal mocosa, the possibility of polyps and the consistency of the nasal mucous.\par All findings were normal except:\par \par On the right side, the mucosa was somewhat dry.  She has an a large inferior meatal antrostomy with inferior turbinate reduction but the sinuses were clear.\par \par On the left, she had dryness and inflammation along the septum.  She had yellow mustard colored discharge in the left middle meatus.  Switching to a rigid 0 degree endoscope the crusting was removed from the left middle meatus with suction and curettage.  Further anesthesia was placed and with a antral suction purulent discharge was suctioned from the left antrum to clear\par This was cultured\par CPT 91205\par \par

## 2023-03-21 NOTE — ASSESSMENT
[FreeTextEntry1] : It was my impression that she has the rhinitis and has another episode of sinusitis from the mucosal disease on the left.  Her last 2 cultures were methicillin sensitive staph and her previous ones had been methicillin-resistant.  In any case, I placed her on Bactrim, pending the culture results and recommend that she go back to the mupirocin rinses at least once a day.  I will change antibiotics if indicated and otherwise would like to see her back in follow-up in 3 weeks to make sure that this has responded\par \par

## 2023-03-21 NOTE — HISTORY OF PRESENT ILLNESS
[de-identified] : EDWIN JALLOH was seen on March 15.  She comes in complaining that she may have a sinus infection.  She notes that she has an inflammation in the nasal cavity and some yellowish discharge but no significant malodor.  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  The patient had no other ear nose or throat complaints at this visit.

## 2023-04-04 ENCOUNTER — APPOINTMENT (OUTPATIENT)
Dept: OTOLARYNGOLOGY | Facility: CLINIC | Age: 68
End: 2023-04-04
Payer: MEDICARE

## 2023-04-04 DIAGNOSIS — J34.0 ABSCESS, FURUNCLE AND CARBUNCLE OF NOSE: ICD-10-CM

## 2023-04-04 PROCEDURE — 31237 NSL/SINS NDSC SURG BX POLYPC: CPT

## 2023-04-04 PROCEDURE — 99213 OFFICE O/P EST LOW 20 MIN: CPT | Mod: 25

## 2023-04-04 NOTE — ASSESSMENT
[FreeTextEntry1] : It was my impression that she has the OZENOID changes.  She had the crusting on the septum and I suggested not using nasal steroids and suggested topical moisturizing.  After removing the crust from the ethmoid sinuses looked clear and at this point I did not retreat with antibiotics.  Her most recent culture was a sensitive staph and she in fact developed an allergy to Bactrim and she cannot take quinolones so should the MRSA recur we have been more limited in our choices.  In any case, I reassured her but wanted to recheck in a month or earlier if needed\par

## 2023-04-04 NOTE — PHYSICAL EXAM
[FreeTextEntry1] : General:\par The patient was alert and oriented and in no distress.\par Voice was clear.\par \par Face:\par The patient had no facial asymmetry or mass.\par The skin was unremarkable.\par \par Eyes:\par She has a disconjugate gaze\par \par Ears:\par The external ears were normal without deformity.\par The ear canals were clear.\par The tympanic membranes were intact and normal.\par \par Oral cavity:\par The oral mucosa was normal.\par The oral and base of tongue were clear and without mass.\par The gingival and buccal mucosa were moist and without lesions.\par The palate moved well.\par There was no cleft to the palate.\par There appeared to be good salivary flow.  \par There was no pus, erythema or mass in the oral cavity.\par \par Neuro:\par Neurologically, the patient was awake, alert, and oriented to person, place and time. There were no obvious focal neurologic abnormalities.  Cranial nerves II through XII were grossly intact.\par \par Nasal endoscopy: \par CPT 66024\par Procedure Note:\par \par Endoscopy was done with Covid precautions and with video. All risks and benefits were discussed with the patient and consent obtained.\par \par Nasal endoscopy was done with topical anesthesia of Pontocaine and Afrin and a      nasal endoscope.\par Indication: Evaluation of response to therapy\par Procedure: The nasal cavity was anesthetized with topical Afrin and Pontocaine. An  endoscope was used and inserted into the nasal cavity.\par Attention was first paid to the anterior nasal cavity.\par Endocoscopy was performed to inspect the interior of the nasal cavity, the nasal septum,  the middle and superior meati, the inferior, middle and superior turbinates, and the spheno-ethmoidal  recesses, the nasopharynx and eustachian tube orifices bilaterally. including the nasal mocosa, the possibility of polyps and the consistency of the nasal mucous.\par All findings were normal except:\par She had the crusting along the nasal septum on the left as well as crusting in the left ethmoidal bed.  However the antrum was clear\par \par Dragon nasal endoscopy for debridement.  CPT 71728\par \par Further anesthesia was used for the left nasal cavity.\par This was done for debridement.\par The crusting was removed sharply with a forward biting forceps from the septum which had a shallow ulceration beneath this.  This was not completely debrided.  She had a large crust in the ethmoid and this was removed and beyond that, however the mucosa looked better\par

## 2023-04-04 NOTE — HISTORY OF PRESENT ILLNESS
[de-identified] : EDWIN JALLOH was seen in follow-up on April 4.  Her culture this time was methicillin sensitive staph and 5 days into a course of Bactrim she developed rash and stopped.  She feels much better, however.  She comes in for repeat debridement.  She did have MRSA in the past.  The patient had no other ear nose or throat complaints at this visit.

## 2023-04-05 RX ORDER — MUPIROCIN 20 MG/G
2 OINTMENT TOPICAL
Qty: 6 | Refills: 3 | Status: ACTIVE | COMMUNITY
Start: 2023-04-05 | End: 1900-01-01

## 2023-04-14 ENCOUNTER — APPOINTMENT (OUTPATIENT)
Dept: ORTHOPEDIC SURGERY | Facility: CLINIC | Age: 68
End: 2023-04-14
Payer: MEDICARE

## 2023-04-14 VITALS — BODY MASS INDEX: 44.98 KG/M2 | WEIGHT: 270 LBS | HEIGHT: 65 IN

## 2023-04-14 DIAGNOSIS — S80.02XA CONTUSION OF LEFT KNEE, INITIAL ENCOUNTER: ICD-10-CM

## 2023-04-14 DIAGNOSIS — S63.634A SPRAIN OF INTERPHALANGEAL JOINT OF RIGHT RING FINGER, INITIAL ENCOUNTER: ICD-10-CM

## 2023-04-14 DIAGNOSIS — Z87.898 PERSONAL HISTORY OF OTHER SPECIFIED CONDITIONS: ICD-10-CM

## 2023-04-14 DIAGNOSIS — S80.01XA CONTUSION OF RIGHT KNEE, INITIAL ENCOUNTER: ICD-10-CM

## 2023-04-14 DIAGNOSIS — M17.0 BILATERAL PRIMARY OSTEOARTHRITIS OF KNEE: ICD-10-CM

## 2023-04-14 DIAGNOSIS — S80.12XA CONTUSION OF LEFT KNEE, INITIAL ENCOUNTER: ICD-10-CM

## 2023-04-14 DIAGNOSIS — S22.31XA FRACTURE OF ONE RIB, RIGHT SIDE, INITIAL ENCOUNTER FOR CLOSED FRACTURE: ICD-10-CM

## 2023-04-14 DIAGNOSIS — S80.11XA CONTUSION OF RIGHT KNEE, INITIAL ENCOUNTER: ICD-10-CM

## 2023-04-14 PROCEDURE — 99215 OFFICE O/P EST HI 40 MIN: CPT

## 2023-04-14 PROCEDURE — 71100 X-RAY EXAM RIBS UNI 2 VIEWS: CPT | Mod: RT

## 2023-04-14 PROCEDURE — 73560 X-RAY EXAM OF KNEE 1 OR 2: CPT | Mod: RT

## 2023-04-14 PROCEDURE — 73140 X-RAY EXAM OF FINGER(S): CPT | Mod: RT

## 2023-04-14 RX ORDER — MUPIROCIN 20 MG/G
2 OINTMENT TOPICAL
Qty: 1 | Refills: 3 | Status: COMPLETED | COMMUNITY
Start: 2022-05-03 | End: 2023-04-14

## 2023-04-14 RX ORDER — OXYCODONE 5 MG/1
5 TABLET ORAL 4 TIMES DAILY
Qty: 25 | Refills: 0 | Status: ACTIVE | COMMUNITY
Start: 2023-04-14 | End: 1900-01-01

## 2023-04-14 RX ORDER — MUPIROCIN 20 MG/G
2 OINTMENT TOPICAL
Qty: 3 | Refills: 3 | Status: COMPLETED | COMMUNITY
Start: 2022-08-03 | End: 2023-04-14

## 2023-04-14 RX ORDER — LIDOCAINE 5% 700 MG/1
5 PATCH TOPICAL
Qty: 30 | Refills: 1 | Status: ACTIVE | COMMUNITY
Start: 2023-04-14 | End: 1900-01-01

## 2023-04-14 NOTE — ASSESSMENT
[FreeTextEntry1] : 67-year-old woman who fell a week ago had a contusion to her right rib cage and likely has 1 rib fractures seen on x-ray.  It can be challenging to see fractures so she could have more.  I would not recommend a CT scan given that treatment would typically not be altered by the diagnosis.  Treatment is typically symptomatic using medication for pain and avoiding aggravating activities and using heat and ice.  She has been having severe pain and difficulty moving and sleeping.  Her weight and arthritis increase the strain on her ribs when she is changing position.\par I recommended that she continue with the Tylenol that she normally takes but could probably take it 3 times a day.  She should check for the dose she is taking the bottle and take the maximum allowed.  She should not take tramadol if I prescribed oxycodone which I did 1 tablet up to 4 times a day as needed for severe pain.  She should try to spread them out and only take if really necessary.  If she masks the pain then she may aggravate the fractures more.  Also it can increase the risk of constipation which can cause more rib pain.\par She can use heat and ice.  Lidocaine patch can also be helpful.  If she can take an anti-inflammatory like Aleve or in the past she was prescribed Celebrex that could help but should only take 1 NSAID.\par She should try to take deep breaths and cough.  Hugging a pillow can be helpful.\par Pain should get progressively better over the next 4 to 6 weeks.  If pain just goes away there is nothing more to do but if she is having ongoing pain or concerns at any point in time she should come back in for follow-up.  If she has any pulmonary issues she should also see her internist.\par For the knee contusion she can use heat and ice.\par For the finger sprain neck she should limit use and let it heal and follow-up with a hand specialist if it is continuing to be painful.\par

## 2023-04-14 NOTE — HISTORY OF PRESENT ILLNESS
[de-identified] : Ms. Arguello is a 68 y/o RHD woman who comes in for evaluation for RIGHT side rib pain that started 4/7/23 when she fell at home while on the stairs. She hit her RIGHT side. She has vertigo and was very dizzy when she fell.  She also hit her knees and has bruising.\par Her RIGHT side ribs are very painful with sharp pain to the touch. She has trouble bending, coughing, bearing down, deep breaths.  No shortness of breath.\par She also injured her RIGHT ring finger. The knee is very bruised but she can move it and walk without significant pain.\par She takes chronic narcotics normally taking 2 tramadol twice a day and takes 2 Tylenol arthritis twice a day.  She has taken Percocet in the past.\par I checked the prescription drug program and scanned into the chart.  She was last prescribed 180 tablets of tramadol January 31, 2023

## 2023-04-14 NOTE — REASON FOR VISIT
[Initial Visit] : an initial visit for [Knee Pain] : knee pain [Knee Injury] : knee injury [FreeTextEntry2] : RIGHT ribs

## 2023-04-14 NOTE — PHYSICAL EXAM
[Normal RUE] : Right Upper Extremity: No scars, rashes, lesions, ulcers, skin intact [Normal LUE] : Left Upper Extremity: No scars, rashes, lesions, ulcers, skin intact [Normal Touch] : sensation intact for touch [Obese] : obese [Normal] : Oriented to person, place, and time, insight and judgement were intact and the affect was normal [de-identified] : Chest wall\par No edema, ecchymosis, erythema.  Skin is intact.\par There is significant tenderness right rib cage anterior axillary line at approximately the seventh or eighth rib.  No crepitus palpated.\par She appears to be breathing comfortably but does have some pain when she changes position and reaching with her arm.\par \par Right ring finger\par No significant edema, ecchymoses, erythema, deformity.\par Tender at the PIP joint and proximal phalanx just distal to the joint.  Intact flexion and extension of the finger.\par Motor and sensation are intact.\par \par Knees\par Ecchymoses bilateral anterior knees with mild edema.  Skin is intact.  Mildly antalgic gait.\par Knee range of motion about 0 to 120 degrees flexion.\par Stable varus and valgus laxity.  Intact extensor mechanism. [de-identified] : Bruising bilateral knees [de-identified] : \par \par X-rays taken today of right ribs 2 views and a PA chest x-ray shows no evidence of pneumothorax.\par Oblique view there does appear to be a nondisplaced fracture of 1 rib.  Ribs  partially obscured by overlying soft tissue and other anatomy.  No displaced rib fractures seen.\par \par X-rays of the right knee AP and lateral views today show moderately severe tricompartmental degenerative arthrosis.  No fractures seen.\par \par X-rays of the right ring finger 3 views today show no visible fractures.  Mild degenerative changes

## 2023-04-28 RX ORDER — SULFAMETHOXAZOLE AND TRIMETHOPRIM 800; 160 MG/1; MG/1
800-160 TABLET ORAL TWICE DAILY
Qty: 20 | Refills: 0 | Status: ACTIVE | COMMUNITY
Start: 2023-04-28 | End: 1900-01-01

## 2023-04-28 RX ORDER — FLUCONAZOLE 200 MG/1
200 TABLET ORAL DAILY
Qty: 3 | Refills: 1 | Status: ACTIVE | COMMUNITY
Start: 2023-04-28 | End: 1900-01-01

## 2023-05-10 ENCOUNTER — APPOINTMENT (OUTPATIENT)
Dept: OTOLARYNGOLOGY | Facility: CLINIC | Age: 68
End: 2023-05-10
Payer: MEDICARE

## 2023-05-10 PROCEDURE — 99214 OFFICE O/P EST MOD 30 MIN: CPT | Mod: 25

## 2023-05-10 PROCEDURE — 31237 NSL/SINS NDSC SURG BX POLYPC: CPT

## 2023-05-10 RX ORDER — FLUCONAZOLE 200 MG/1
200 TABLET ORAL DAILY
Qty: 3 | Refills: 1 | Status: ACTIVE | COMMUNITY
Start: 2023-05-10 | End: 1900-01-01

## 2023-05-10 RX ORDER — CEFUROXIME AXETIL 500 MG/1
500 TABLET ORAL
Qty: 20 | Refills: 1 | Status: ACTIVE | COMMUNITY
Start: 2023-05-10 | End: 1900-01-01

## 2023-05-16 LAB — EAR NOSE AND THROAT CULTURE: ABNORMAL

## 2023-05-17 RX ORDER — AMOXICILLIN AND CLAVULANATE POTASSIUM 875; 125 MG/1; MG/1
875-125 TABLET, COATED ORAL TWICE DAILY
Qty: 20 | Refills: 1 | Status: ACTIVE | COMMUNITY
Start: 2023-05-17 | End: 1900-01-01

## 2023-05-23 ENCOUNTER — APPOINTMENT (OUTPATIENT)
Dept: OTOLARYNGOLOGY | Facility: CLINIC | Age: 68
End: 2023-05-23

## 2023-05-24 ENCOUNTER — APPOINTMENT (OUTPATIENT)
Dept: OTOLARYNGOLOGY | Facility: CLINIC | Age: 68
End: 2023-05-24
Payer: MEDICARE

## 2023-05-24 VITALS — BODY MASS INDEX: 44.98 KG/M2 | HEIGHT: 65 IN | WEIGHT: 270 LBS

## 2023-05-24 DIAGNOSIS — H60.8X3 OTHER OTITIS EXTERNA, BILATERAL: ICD-10-CM

## 2023-05-24 PROCEDURE — 31237 NSL/SINS NDSC SURG BX POLYPC: CPT | Mod: RT

## 2023-05-24 PROCEDURE — 99213 OFFICE O/P EST LOW 20 MIN: CPT | Mod: 25

## 2023-05-24 RX ORDER — FLUCONAZOLE 200 MG/1
200 TABLET ORAL DAILY
Qty: 3 | Refills: 1 | Status: ACTIVE | COMMUNITY
Start: 2023-05-24 | End: 1900-01-01

## 2023-05-24 NOTE — PHYSICAL EXAM
[FreeTextEntry1] : General:\par The patient was alert and oriented and in no distress.\par Voice was clear.\par \par Ears:\par The external ears were normal without deformity.\par The ear canals were clear.\par The tympanic membranes were intact and normal.\par \par Oral cavity:\par The oral mucosa was normal.\par The oral and base of tongue were clear and without mass.\par The gingival and buccal mucosa were moist and without lesions.\par The palate moved well.\par There was no cleft to the palate.\par There appeared to be good salivary flow.  \par There was no pus, erythema or mass in the oral cavity.\par \par \par Nasal endoscopy with debridement: \par CPT 55443\par Procedure Note:\par \par Endoscopy was done with Covid precautions and with video. All risks and benefits were discussed with the patient and consent obtained.\par \par Nasal endoscopy was done with topical anesthesia of Pontocaine and Afrin and a   rigid 0 and rigid 30 degree   nasal endoscope.\par Indication: Chronic sinusitis evaluation of response to therapy\par Procedure: The nasal cavity was anesthetized with topical Afrin and Pontocaine. An  endoscope was used and inserted into the nasal cavity.\par Attention was first paid to the anterior nasal cavity.\par Endocoscopy was performed to inspect the interior of the nasal cavity, the nasal septum,  the middle and superior meati, the inferior, middle and superior turbinates, and the spheno-ethmoidal  recesses, the nasopharynx and eustachian tube orifices bilaterally. including the nasal mocosa, the possibility of polyps and the consistency of the nasal mucous.\par All findings were normal except\par \par She had the right septal deflection.  There remained crusting on the anterior septum on the left which was partially debrided.  The left antrum had yellow-green crusts.  This was removed with straight suction and forward biting forceps.  Beyond that, however, the mucosa looked much better.  Evaluation of the antrum with a 30 degree endoscope showed no further purulence.:\par

## 2023-05-24 NOTE — HISTORY OF PRESENT ILLNESS
[de-identified] : EDWIN JALLOH was seen on May 10.  She had a cold a week or so ago and thought she might have a sinus infection.  However she was feeling somewhat better afterwards.  She comes in for evaluation, however.  She has the chronic ozena with recurrent infections primarily on the left.  The patient had no other ear nose or throat complaints at this visit.

## 2023-05-24 NOTE — HISTORY OF PRESENT ILLNESS
[de-identified] : EDWIN JALLOH is a 67 year old female who comes in complaining of an increase in her blood sugars and is concerned that she still has a sinus infection.  I had last seen her 2 weeks ago and her culture showed methicillin sensitive staph.  She was taking augmentin.

## 2023-05-24 NOTE — ASSESSMENT
[FreeTextEntry1] : It is my impression that the patient has an an exczematoid otitis externa.  The pathogenesis was discussed.  I suggested avoiding Q-tips.  I recommended topical moisturizing and using either Dermotic drops or other oil drops.  I suggested repeat evaluation in 6 months or earlier should the need arise.\par She has a long history of eczema and she has a significant purulent sinusitis with crusting.  The sinus and nasal cavity was debrided and cultured.  She is allergic to Bactrim and Cipro and I placed her on cefuroxime 500 mg pending the culture results.  I would like to reevaluate in 3 weeks to make sure this has responded and we will change antibiotics if indicated by the culture.  I recommend continuing with her nasal lavages.\par She declined audiometric evaluation

## 2023-05-24 NOTE — PHYSICAL EXAM
[FreeTextEntry1] : General:\par The patient was alert and oriented and in no distress.\par Voice was clear.\par \par Face:\par The patient had no facial asymmetry or mass.\par The skin was unremarkable.\par \par Ears:\par The external ears were normal without deformity.\par The ear canals were clear.\par The tympanic membranes were intact and normal.\par Both ear canals were dry\par \par Audiometry was declined\par \par Nasal endoscopy: \par CPT 35586\par Procedure Note:\par \par Endoscopy was done with Covid precautions and with video. All risks and benefits were discussed with the patient and consent obtained.\par \par Nasal endoscopy was done with topical anesthesia of Pontocaine and Afrin and a      nasal endoscope.\par Indication: Nasal congestion, rule out sinusitis.\par Procedure: The nasal cavity was anesthetized with topical Afrin and Pontocaine. An  endoscope was used and inserted into the nasal cavity.\par Attention was first paid to the anterior nasal cavity.\par Endocoscopy was performed to inspect the interior of the nasal cavity, the nasal septum,  the middle and superior meati, the inferior, middle and superior turbinates, and the spheno-ethmoidal  recesses, the nasopharynx and eustachian tube orifices bilaterally. including the nasal mocosa, the possibility of polyps and the consistency of the nasal mucous.\par All findings were normal except:\par She had a right septal deflection but the right middle meatus was patent.\par The left nasal cavity was filled with green rockhard crusts.  Switching to a rigid pediatric 30 degree endoscope suctioning and debridement was done.\par She had almost complete pleat obstruction of the posterior nasal cavity and left antrum with rockhard yellow-green crusts.  These were removed and then the antrum was suctioned after culturing\par \par CPT 79091\par \par \par

## 2023-05-24 NOTE — ASSESSMENT
[FreeTextEntry1] : It was my impression that her methicillin sensitive staph sinusitis was almost completely resolved.  She still had some crusting and this was debrided but beyond that the mucosa looked quite good and I reassured her.  It did not seem likely that this was enough to elevate her blood sugars and this was discussed.  Otherwise I wanted her to finish off the course of antibiotics.  I refilled her Diflucan at her request and we just want to reevaluate in 2 weeks to make sure this is completely better.

## 2023-05-24 NOTE — CONSULT LETTER
[FreeTextEntry2] : MIKAEL DAMON\par  [FreeTextEntry1] : \par \par Dear  Dr. MIKAEL DAMON,\par \par I had the pleasure of seeing your patient today.  \par Please see my note below.\par \par \par Thank you very much for allowing me to participate in the care of your patient.\par \par Sincerely,\par \par \par Sidney Ley MD\par [facility]\par [facility]\par  [facility]\par \par

## 2023-06-23 ENCOUNTER — APPOINTMENT (OUTPATIENT)
Dept: OTOLARYNGOLOGY | Facility: CLINIC | Age: 68
End: 2023-06-23
Payer: MEDICARE

## 2023-06-23 VITALS — WEIGHT: 270 LBS | HEIGHT: 65 IN | BODY MASS INDEX: 44.98 KG/M2

## 2023-06-23 DIAGNOSIS — H90.5 UNSPECIFIED SENSORINEURAL HEARING LOSS: ICD-10-CM

## 2023-06-23 PROCEDURE — 99213 OFFICE O/P EST LOW 20 MIN: CPT | Mod: 25

## 2023-06-23 PROCEDURE — 31231 NASAL ENDOSCOPY DX: CPT

## 2023-06-23 RX ORDER — MUPIROCIN 20 MG/G
2 OINTMENT TOPICAL
Qty: 1 | Refills: 3 | Status: ACTIVE | COMMUNITY
Start: 2023-06-23 | End: 1900-01-01

## 2023-06-23 RX ORDER — FLUCONAZOLE 200 MG/1
200 TABLET ORAL DAILY
Qty: 3 | Refills: 1 | Status: ACTIVE | COMMUNITY
Start: 2023-06-23 | End: 1900-01-01

## 2023-06-23 NOTE — CONSULT LETTER
[FreeTextEntry2] : MIKAEL DAMON\par  [FreeTextEntry1] : \par \par Dear  Dr. MIKAEL DAMON,\par \par I had the pleasure of seeing your patient today.  \par Please see my note below.\par \par \par Thank you very much for allowing me to participate in the care of your patient.\par \par Sincerely,\par \par \par Sidney Ley MD\par [facility]\par [facility]\par  [facility]\par \par

## 2023-06-23 NOTE — HISTORY OF PRESENT ILLNESS
[de-identified] : EDWIN JALLOH was seen in follow-up on June 23.  She is complaining of nasal dryness but her sinuses feel better.  Her sugars have been controlled again.  She comes in for repeat evaluation.

## 2023-06-23 NOTE — PHYSICAL EXAM
[FreeTextEntry1] : General:\par The patient was alert and oriented and in no distress.\par Voice was clear.\par \par Ears:\par The external ears were normal without deformity.\par The ear canals were clear.\par The tympanic membranes were intact and normal.\par \par Oral cavity:\par The oral mucosa was normal.\par The oral and base of tongue were clear and without mass.\par The gingival and buccal mucosa were moist and without lesions.\par The palate moved well.\par There was no cleft to the palate.\par There appeared to be good salivary flow.  \par There was no pus, erythema or mass in the oral cavity.\par \par \par Nasal endoscopy with debridement: \par Nasal endoscopy:\par \par Nasal endoscopy was done with topical anesthesia and a flexible endoscope to evaluate for nasal polyps, chronic sinusitis and response to therapy.\par Endocoscopy was performed to inspect the interior of the nasal cavity, the nasal septum,  the middle and superior meati, the inferior, middle and superior turbinates, and the spheno-ethmoidal  recesses, the nasopharynx and eustachian tube orifices bilaterally\par \par Endoscopy was done with Covid precautions and with video. All risks and benefits were discussed with the patient and consent obtained.\par \par \par Evaluation showed that the septum was midline.  There was no significant mucosal disease.  The inferior turbinates and middle turbinates were normal.  On both sides, the surgically opened ethmoids, antra, and frontal recesses were clear.  There was no evidence of polypoid recurrences and no purulence.  The mucosa was close to stage zero.  The nasopharynx was benign.    The middle and  superior meati were normal and the sphenoethmoidal recesses were without evidence of disease. There were no recurrent polyps and the nasal mucous appeared normal.\par \par She still had significant dryness and crusting throughout especially on the left

## 2023-06-23 NOTE — ASSESSMENT
[FreeTextEntry1] : It was my impression that her sinusitis had responded.  She has no further purulence and her sugars are controlled again.  However, the mucosa especially on the left remains quite dry and I reminded her about using the mupirocin regularly.  I will see her back in follow-up in 6 weeks in case she needs repeat debridement\par \par I refilled her mupirocin and Diflucan at her request

## 2023-06-23 NOTE — REASON FOR VISIT
[Subsequent Evaluation] : a subsequent evaluation for [Sinusitis] : sinusitis [FreeTextEntry2] : follow up

## 2023-08-01 ENCOUNTER — APPOINTMENT (OUTPATIENT)
Dept: OTOLARYNGOLOGY | Facility: CLINIC | Age: 68
End: 2023-08-01

## 2023-09-22 ENCOUNTER — APPOINTMENT (OUTPATIENT)
Dept: OTOLARYNGOLOGY | Facility: CLINIC | Age: 68
End: 2023-09-22
Payer: MEDICARE

## 2023-09-22 VITALS — WEIGHT: 270 LBS | HEIGHT: 65 IN | BODY MASS INDEX: 44.98 KG/M2

## 2023-09-22 DIAGNOSIS — J32.9 CHRONIC SINUSITIS, UNSPECIFIED: ICD-10-CM

## 2023-09-22 DIAGNOSIS — B47.9 MYCETOMA, UNSPECIFIED: ICD-10-CM

## 2023-09-22 DIAGNOSIS — K14.0 GLOSSITIS: ICD-10-CM

## 2023-09-22 DIAGNOSIS — H61.23 IMPACTED CERUMEN, BILATERAL: ICD-10-CM

## 2023-09-22 PROCEDURE — 31237 NSL/SINS NDSC SURG BX POLYPC: CPT | Mod: LT

## 2023-09-22 PROCEDURE — 69210 REMOVE IMPACTED EAR WAX UNI: CPT

## 2023-09-22 PROCEDURE — 99213 OFFICE O/P EST LOW 20 MIN: CPT | Mod: 25

## 2023-09-22 RX ORDER — TIRZEPATIDE 5 MG/.5ML
5 INJECTION, SOLUTION SUBCUTANEOUS
Refills: 0 | Status: ACTIVE | COMMUNITY

## 2023-09-28 ENCOUNTER — APPOINTMENT (OUTPATIENT)
Dept: OTOLARYNGOLOGY | Facility: CLINIC | Age: 68
End: 2023-09-28
Payer: MEDICARE

## 2023-09-28 VITALS — HEIGHT: 65 IN | WEIGHT: 263 LBS | BODY MASS INDEX: 43.82 KG/M2

## 2023-09-28 DIAGNOSIS — H90.A21 SENSORINEURAL HEARING LOSS, UNILATERAL, RIGHT EAR, WITH RESTRICTED HEARING ON THE CONTRALATERAL SIDE: ICD-10-CM

## 2023-09-28 PROCEDURE — 99213 OFFICE O/P EST LOW 20 MIN: CPT | Mod: 25

## 2023-09-28 PROCEDURE — 31231 NASAL ENDOSCOPY DX: CPT

## 2023-09-28 RX ORDER — FLUCONAZOLE 200 MG/1
200 TABLET ORAL DAILY
Qty: 3 | Refills: 1 | Status: ACTIVE | COMMUNITY
Start: 2023-09-28 | End: 1900-01-01

## 2023-10-03 LAB
BACTERIA THROAT CULT: NORMAL
EAR NOSE AND THROAT CULTURE: ABNORMAL

## 2023-10-04 ENCOUNTER — APPOINTMENT (OUTPATIENT)
Dept: OTOLARYNGOLOGY | Facility: CLINIC | Age: 68
End: 2023-10-04
Payer: MEDICARE

## 2023-10-04 VITALS — BODY MASS INDEX: 43.82 KG/M2 | WEIGHT: 263 LBS | HEIGHT: 65 IN

## 2023-10-04 PROCEDURE — 31231 NASAL ENDOSCOPY DX: CPT

## 2023-10-04 PROCEDURE — 99214 OFFICE O/P EST MOD 30 MIN: CPT | Mod: 25

## 2023-10-04 RX ORDER — LEVOFLOXACIN 500 MG/1
500 TABLET, FILM COATED ORAL
Qty: 7 | Refills: 3 | Status: ACTIVE | COMMUNITY
Start: 2023-10-04 | End: 1900-01-01

## 2023-10-04 RX ORDER — MUPIROCIN 20 MG/G
2 OINTMENT TOPICAL
Qty: 3 | Refills: 3 | Status: ACTIVE | COMMUNITY
Start: 2023-10-04 | End: 1900-01-01

## 2023-10-04 RX ORDER — MUPIROCIN 20 MG/G
2 OINTMENT TOPICAL
Qty: 1 | Refills: 3 | Status: ACTIVE | COMMUNITY
Start: 2023-10-04 | End: 1900-01-01

## 2023-10-31 ENCOUNTER — APPOINTMENT (OUTPATIENT)
Dept: OTOLARYNGOLOGY | Facility: CLINIC | Age: 68
End: 2023-10-31

## 2023-11-06 ENCOUNTER — APPOINTMENT (OUTPATIENT)
Dept: ORTHOPEDIC SURGERY | Facility: CLINIC | Age: 68
End: 2023-11-06

## 2024-04-16 ENCOUNTER — APPOINTMENT (OUTPATIENT)
Dept: OTOLARYNGOLOGY | Facility: CLINIC | Age: 69
End: 2024-04-16
Payer: MEDICARE

## 2024-04-16 PROCEDURE — 31231 NASAL ENDOSCOPY DX: CPT

## 2024-04-16 PROCEDURE — 99214 OFFICE O/P EST MOD 30 MIN: CPT | Mod: 25

## 2024-04-16 RX ORDER — MUPIROCIN 20 MG/G
2 OINTMENT TOPICAL
Qty: 1 | Refills: 3 | Status: ACTIVE | COMMUNITY
Start: 2024-04-16 | End: 1900-01-01

## 2024-04-16 NOTE — PHYSICAL EXAM
[FreeTextEntry1] : General: The patient was alert and oriented and in no distress. Voice was clear.  TMJ: The temporomandibular joints were tender, especially on the left.  She has an asymmetric bite and crepitus more on the left than on the right  Ears: The external ears were normal without deformity. The ear canals were clear. The tympanic membranes were intact and normal.   Oral cavity: The oral mucosa was normal. The oral and base of tongue were clear and without mass. The gingival and buccal mucosa were moist and without lesions. The palate moved well. There was no cleft to the palate. There appeared to be good salivary flow.   There was no pus, erythema or mass in the oral cavity.   Face: The patient had no facial asymmetry or mass. The skin was unremarkable.   Nasal endoscopy:  CPT 05342 Procedure Note:  Endoscopy was done with Covid precautions and with video. All risks and benefits were discussed with the patient and consent obtained.  Nasal endoscopy was done with topical anesthesia of Pontocaine and Afrin and a      nasal endoscope. Indication: Nasal congestion, rule out sinusitis. Procedure: The nasal cavity was anesthetized with topical Afrin and Pontocaine. An  endoscope was used and inserted into the nasal cavity. Attention was first paid to the anterior nasal cavity. Endocoscopy was performed to inspect the interior of the nasal cavity, the nasal septum,  the middle and superior meati, the inferior, middle and superior turbinates, and the spheno-ethmoidal  recesses, the nasopharynx and eustachian tube orifices bilaterally. including the nasal mocosa, the possibility of polyps and the consistency of the nasal mucous. All findings were normal except:  She has dry flaking mucosa of the anterior nasal cavity on both sides with crusting, more on the left than on the right.  Her previously operated on sinuses all were clear in the area of her usual crusting if the left maxillary sinus was patent and without evidence of infection

## 2024-04-16 NOTE — ASSESSMENT
[FreeTextEntry1] : It was my impression is that the patient's symptoms were from the temporomandibular joint. I recommended warm compresses, a soft diet, and anti-inflammatories. I would recommend following up further with the patient's dentist for a possible bruxism appliance if this fails to respond.  She has the epistaxis the rhinitis and the crusting and dryness of the nasal cavities anteriorly.  More on the left than on the right.  I recommended twice daily mupirocin ointment.  And would like to see her back in follow-up in a month to make sure this has resolved

## 2024-04-16 NOTE — HISTORY OF PRESENT ILLNESS
[de-identified] : EDWIN JALLOH Was seen on April 16.  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  She is complaining of dryness and left greater than right sided crusting with epistaxis on the left.  She is also complaining of left jaw pain.  The patient had no other ear nose or throat complaints at this visit.

## 2024-04-22 ENCOUNTER — NON-APPOINTMENT (OUTPATIENT)
Age: 69
End: 2024-04-22

## 2024-04-25 ENCOUNTER — APPOINTMENT (OUTPATIENT)
Dept: PULMONOLOGY | Facility: CLINIC | Age: 69
End: 2024-04-25
Payer: MEDICARE

## 2024-04-25 VITALS
OXYGEN SATURATION: 97 % | TEMPERATURE: 96.4 F | BODY MASS INDEX: 39.15 KG/M2 | WEIGHT: 235 LBS | HEART RATE: 78 BPM | HEIGHT: 65 IN

## 2024-04-25 DIAGNOSIS — R06.83 SNORING: ICD-10-CM

## 2024-04-25 DIAGNOSIS — G47.19 OTHER HYPERSOMNIA: ICD-10-CM

## 2024-04-25 DIAGNOSIS — Z86.73 PERSONAL HISTORY OF TRANSIENT ISCHEMIC ATTACK (TIA), AND CEREBRAL INFARCTION W/OUT RESIDUAL DEFICITS: ICD-10-CM

## 2024-04-25 DIAGNOSIS — I48.92 UNSPECIFIED ATRIAL FLUTTER: ICD-10-CM

## 2024-04-25 PROCEDURE — 99204 OFFICE O/P NEW MOD 45 MIN: CPT

## 2024-04-26 NOTE — DISCUSSION/SUMMARY
[FreeTextEntry1] : snoring, excessive daytime somnolence, hx stroke and atrial fibrillation   Based on history and physical exam, sleep disordered breathing is very likely.  The patient is advised that in addition to worsening sleep leading to daytime sleepiness, sleep apnea, at least when severe, may be associated with worsening hypertension and diabetes, and may be a risk factor for cardiovascular disease and stroke.  The patient was advised to have overnight polysomnography, and will be seen in follow up after testing.

## 2024-04-26 NOTE — HISTORY OF PRESENT ILLNESS
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  She has a history of intermittent atrial fibrillation and atrial flutter and has been admitted at least twice with confusion and found to have multiple strokes.  She is seen here today with a friend.  She has been observed to have at least some snoring, possible witnessed apnea.  Usual usual bedtime is 1 AM, sleep latency 60 minutes, up at 8 AM after 0-1 awakenings.  She does notice mild daytime sleepiness, [place] sleepiness score 10 out of 24.  She is overweight, but over the last few months as lost about 20 pounds.  Other medical problems include hypertension gastroesophageal reflux and diabetes.  Medications include metformin and glimepiride Mounjaro Eliquis Toujeo metoprolol furosemide valsartan atorvastatin and donepezil

## 2024-04-26 NOTE — PHYSICAL EXAM
[No Acute Distress] : no acute distress [Normal Oropharynx] : normal oropharynx [III] : Mallampati Class: III [Normal Appearance] : normal appearance [No Neck Mass] : no neck mass [Normal Rate/Rhythm] : normal rate/rhythm [Normal S1, S2] : normal s1, s2 [No Murmurs] : no murmurs [No Resp Distress] : no resp distress [Clear to Auscultation Bilaterally] : clear to auscultation bilaterally [No Abnormalities] : no abnormalities [Benign] : benign [Normal Gait] : normal gait [No Clubbing] : no clubbing [No Cyanosis] : no cyanosis [FROM] : FROM [1+ Pitting] : 1+ pitting [Normal Color/ Pigmentation] : normal color/ pigmentation [Oriented x3] : oriented x3 [Normal Affect] : normal affect [TextBox_2] : obese

## 2024-04-30 ENCOUNTER — APPOINTMENT (OUTPATIENT)
Dept: OTOLARYNGOLOGY | Facility: CLINIC | Age: 69
End: 2024-04-30
Payer: MEDICARE

## 2024-04-30 DIAGNOSIS — H61.23 IMPACTED CERUMEN, BILATERAL: ICD-10-CM

## 2024-04-30 DIAGNOSIS — R04.0 EPISTAXIS: ICD-10-CM

## 2024-04-30 PROCEDURE — 69210 REMOVE IMPACTED EAR WAX UNI: CPT

## 2024-04-30 PROCEDURE — 31237 NSL/SINS NDSC SURG BX POLYPC: CPT | Mod: LT

## 2024-04-30 PROCEDURE — 99214 OFFICE O/P EST MOD 30 MIN: CPT | Mod: 25

## 2024-05-02 ENCOUNTER — OUTPATIENT (OUTPATIENT)
Dept: OUTPATIENT SERVICES | Facility: HOSPITAL | Age: 69
LOS: 1 days | End: 2024-05-02
Payer: MEDICARE

## 2024-05-02 ENCOUNTER — APPOINTMENT (OUTPATIENT)
Dept: SLEEP CENTER | Facility: HOSPITAL | Age: 69
End: 2024-05-02

## 2024-05-02 DIAGNOSIS — G47.33 OBSTRUCTIVE SLEEP APNEA (ADULT) (PEDIATRIC): ICD-10-CM

## 2024-05-02 PROCEDURE — 95810 POLYSOM 6/> YRS 4/> PARAM: CPT

## 2024-05-02 PROCEDURE — 95810 POLYSOM 6/> YRS 4/> PARAM: CPT | Mod: 26

## 2024-05-04 NOTE — ASSESSMENT
[FreeTextEntry1] : It was my impression that she had done well as far as the epistaxis.  The nasal mucosa is improved.  She had significant crusting and debris in the left antrum and left middle meatus that was cleaned.  I again want her to go back to using the mupirocin and 6 hypertonic saline rinses regularly which she has not been doing and hopefully that will take care of the issue.  I took a culture and will treat if needed but otherwise would like to see her back in follow-up in a month or earlier if she does not respond  It was my impression that the patient had a cerumen impaction that was cleared.  I recommended topical moisturizing.  I recommended avoiding Q-tips.  I reviewed aural hygiene and the role of cerumen with the patient.  I suggested a repeat visit in a year or earlier should the need arise.

## 2024-05-04 NOTE — PHYSICAL EXAM
[FreeTextEntry1] : General: The patient was alert and oriented and in no distress. Voice was clear.  She had amblyopia  Ears:  Procedure note:  Removal of Cerumen Impactions, both ears:  49118-19 Both external ears were normal. There were symptomatic cerumen impactions in both ears.  These were cleared microscopically without trauma using both suction and curettes.  After clearing,  both ear canals were clear both eardrums were intact and mobile.    verbal consent was obtained and patient felt improvement after procedure  Oral cavity: The oral mucosa was normal. The oral and base of tongue were clear and without mass. The gingival and buccal mucosa were moist and without lesions. The palate moved well. There was no cleft to the palate. There appeared to be good salivary flow.   There was no pus, erythema or mass in the oral cavity.  Face: The patient had no facial asymmetry or mass. The skin was unremarkable.  Nasal endoscopy:  CPT 34904 Procedure Note:  Endoscopy was done with Covid precautions and with video. All risks and benefits were discussed with the patient and consent obtained.  Nasal endoscopy was done with topical anesthesia of Pontocaine and Afrin and a      nasal endoscope. Indication: Nasal congestion, rule out sinusitis. Procedure: The nasal cavity was anesthetized with topical Afrin and Pontocaine. An  endoscope was used and inserted into the nasal cavity. Attention was first paid to the anterior nasal cavity. Endocoscopy was performed to inspect the interior of the nasal cavity, the nasal septum,  the middle and superior meati, the inferior, middle and superior turbinates, and the spheno-ethmoidal  recesses, the nasopharynx and eustachian tube orifices bilaterally. including the nasal mocosa, the possibility of polyps and the consistency of the nasal mucous. All findings were normal except:  The nasal mucosa remains dry.  On the right side the sinuses were patent.  On the left, the antrum was covered with green crusting.  Switching to a rigid 30 degree endoscope the left middle meatus was further anesthetized.  And antral suction and forward biting forceps were used to debride the crusting and beyond that there was no evidence of ongoing purulence

## 2024-05-06 LAB — EAR NOSE AND THROAT CULTURE: ABNORMAL

## 2024-05-15 ENCOUNTER — APPOINTMENT (OUTPATIENT)
Dept: OTOLARYNGOLOGY | Facility: CLINIC | Age: 69
End: 2024-05-15
Payer: MEDICARE

## 2024-05-15 PROCEDURE — 99203 OFFICE O/P NEW LOW 30 MIN: CPT | Mod: 25

## 2024-05-15 PROCEDURE — 31237 NSL/SINS NDSC SURG BX POLYPC: CPT | Mod: LT

## 2024-05-15 RX ORDER — AMOXICILLIN AND CLAVULANATE POTASSIUM 875; 125 MG/1; MG/1
875-125 TABLET, COATED ORAL TWICE DAILY
Qty: 20 | Refills: 2 | Status: ACTIVE | COMMUNITY
Start: 2024-05-15 | End: 1900-01-01

## 2024-05-15 NOTE — HISTORY OF PRESENT ILLNESS
[de-identified] : EDWIN JALLOH was seen in follow-up on May 15.  Her culture was methicillin sensitive staph and she has been using Bactroban rinses, most of the time.  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

## 2024-05-15 NOTE — PHYSICAL EXAM
[FreeTextEntry1] : General: The patient was alert and oriented and in no distress. Voice was clear.  Face: The patient had no facial asymmetry or mass. The skin was unremarkable.    Nasal endoscopy:  CPT 47088 Procedure Note:  Endoscopy was done with Covid precautions and with video. All risks and benefits were discussed with the patient and consent obtained.  Nasal endoscopy was done with topical anesthesia of Pontocaine and Afrin and a      nasal endoscope. Indication: Nasal congestion, rule out sinusitis. Procedure: The nasal cavity was anesthetized with topical Afrin and Pontocaine. An  endoscope was used and inserted into the nasal cavity. Attention was first paid to the anterior nasal cavity. Endocoscopy was performed to inspect the interior of the nasal cavity, the nasal septum,  the middle and superior meati, the inferior, middle and superior turbinates, and the spheno-ethmoidal  recesses, the nasopharynx and eustachian tube orifices bilaterally. including the nasal mocosa, the possibility of polyps and the consistency of the nasal mucous. All findings were normal except: On the left, there was significant amounts of rockhard yellow-green crusting in the middle meatus and antrum.   With further anesthesia attention was paid to endoscopy for debridement CPT 73775 Using 30 degree rigid endoscope and forward biting forceps and suction, large amounts of rockhard yellow-green crusting removed from the left middle meatus and with a curved suction and up-biting forceps from the left antrum as well.  Beyond that was eddie purulence I did not reculture

## 2024-05-15 NOTE — ASSESSMENT
[FreeTextEntry1] : It was my impression that she had the purulent sinusitis behind the significant crusting of the left antrum.  This was debrided and suctioned to clear The culture was MSSA and she had been using Bactroban rinses.  I suggested continuing but added a prescription for Augmentin and would like to see her back for repeat evaluation and debridement if needed in 2 to 3 weeks

## 2024-05-29 ENCOUNTER — APPOINTMENT (OUTPATIENT)
Dept: OTOLARYNGOLOGY | Facility: CLINIC | Age: 69
End: 2024-05-29

## 2024-06-03 ENCOUNTER — NON-APPOINTMENT (OUTPATIENT)
Age: 69
End: 2024-06-03

## 2024-06-05 ENCOUNTER — APPOINTMENT (OUTPATIENT)
Dept: OTOLARYNGOLOGY | Facility: CLINIC | Age: 69
End: 2024-06-05
Payer: MEDICARE

## 2024-06-05 VITALS — WEIGHT: 235 LBS | BODY MASS INDEX: 39.15 KG/M2 | HEIGHT: 65 IN

## 2024-06-05 DIAGNOSIS — J32.0 CHRONIC MAXILLARY SINUSITIS: ICD-10-CM

## 2024-06-05 DIAGNOSIS — J32.9 CHRONIC SINUSITIS, UNSPECIFIED: ICD-10-CM

## 2024-06-05 DIAGNOSIS — H90.3 SENSORINEURAL HEARING LOSS, BILATERAL: ICD-10-CM

## 2024-06-05 DIAGNOSIS — J31.0 CHRONIC RHINITIS: ICD-10-CM

## 2024-06-05 PROCEDURE — 99212 OFFICE O/P EST SF 10 MIN: CPT | Mod: 25

## 2024-06-05 PROCEDURE — 31237 NSL/SINS NDSC SURG BX POLYPC: CPT | Mod: LT

## 2024-06-05 RX ORDER — MUPIROCIN 20 MG/G
2 OINTMENT TOPICAL
Qty: 3 | Refills: 3 | Status: ACTIVE | COMMUNITY
Start: 2024-06-05 | End: 1900-01-01

## 2024-06-05 NOTE — PHYSICAL EXAM
[FreeTextEntry1] : Was seen in follow-up on June 5.  She was using the mupirocin rinses and she was feeling better.  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  She comes in for repeat evaluation and debridement [de-identified] : General: The patient was alert and oriented and in no distress. Voice was clear. She has the amblyopia  Face: The patient had no facial asymmetry or mass. The skin was unremarkable.  Oral cavity: The oral mucosa was normal. The oral and base of tongue were clear and without mass. The gingival and buccal mucosa were moist and without lesions. The palate moved well. There was no cleft to the palate. There appeared to be good salivary flow.   There was no pus, erythema or mass in the oral cavity.   Ears: The external ears were normal without deformity. The ear canals were clear. The tympanic membranes were intact and normal.   returns for middle meatal debridement.  Procedure note:  12223-30  The nasal cavity was anesthetized topically and locally.  Using a rigid 0 and a rigid 30 endoscope, using both straight and curved suction iand a straight and the 30 up-biting forceps,   On the right side, she had a septal deflection but the sinuses were all patent.  On the left, she had dryness along the septum. With a straight suction a forward biting forceps and then a antral suction, the left antrostomy which was partially blocked with yellow-green crusting was debrided to clear Beyond that the mucosa looked much better and the antrum was better as well

## 2024-06-05 NOTE — ASSESSMENT
[FreeTextEntry1] : It was my impression that she had the persistent rhinitis but she has the crusting of the antrum that recurs and is improved with the debridements.  She was significantly better with the mupirocin rinses but this has not resolved completely.  At this point, I recommended 2 more weeks of the mupirocin rinse and felt she could use that for her anterior dryness as well.  I explained the pathogenesis which was probably from stripping the mucosa.  Otherwise I like to see her back in follow-up after she stops the mupirocin and goes back to nasal lavage.

## 2024-06-05 NOTE — HISTORY OF PRESENT ILLNESS
[de-identified] : EDWIN JALLOH Was seen in follow-up on June 5.  She was using the mupirocin rinses and she was feeling better.  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  She comes in for repeat evaluation and debridement

## 2024-06-06 ENCOUNTER — APPOINTMENT (OUTPATIENT)
Dept: PULMONOLOGY | Facility: CLINIC | Age: 69
End: 2024-06-06
Payer: MEDICARE

## 2024-06-06 VITALS
SYSTOLIC BLOOD PRESSURE: 125 MMHG | HEIGHT: 65 IN | TEMPERATURE: 97.8 F | BODY MASS INDEX: 39.99 KG/M2 | HEART RATE: 85 BPM | OXYGEN SATURATION: 97 % | WEIGHT: 240 LBS | DIASTOLIC BLOOD PRESSURE: 77 MMHG

## 2024-06-06 PROCEDURE — 99213 OFFICE O/P EST LOW 20 MIN: CPT

## 2024-06-06 NOTE — ASSESSMENT
[FreeTextEntry1] : Minimal sleep disordered breathing, I do not think this requires any treatment.  I have reassured her.  Follow-up as needed

## 2024-06-06 NOTE — HISTORY OF PRESENT ILLNESS
[FreeTextEntry1] : 6/6/2024: Seen in follow-up after overnight polysomnography done 5/2/2024, reviewed with patient and friend.  Minimal sleep disordered breathing, apnea-hypopnea index 3.2 (4%).  Sleep mildly fragmented, reduced slow-wave sleep.

## 2024-07-03 ENCOUNTER — APPOINTMENT (OUTPATIENT)
Dept: OTOLARYNGOLOGY | Facility: CLINIC | Age: 69
End: 2024-07-03

## 2024-07-16 ENCOUNTER — APPOINTMENT (OUTPATIENT)
Dept: OTOLARYNGOLOGY | Facility: CLINIC | Age: 69
End: 2024-07-16

## 2024-07-17 ENCOUNTER — APPOINTMENT (OUTPATIENT)
Dept: OTOLARYNGOLOGY | Facility: CLINIC | Age: 69
End: 2024-07-17

## 2024-07-17 VITALS — HEIGHT: 65 IN | WEIGHT: 240 LBS | BODY MASS INDEX: 39.99 KG/M2

## 2024-07-17 DIAGNOSIS — H60.8X3 OTHER OTITIS EXTERNA, BILATERAL: ICD-10-CM

## 2024-07-17 DIAGNOSIS — E11.9 TYPE 2 DIABETES MELLITUS W/OUT COMPLICATIONS: ICD-10-CM

## 2024-07-17 DIAGNOSIS — J31.0 CHRONIC RHINITIS: ICD-10-CM

## 2024-07-17 DIAGNOSIS — J32.0 CHRONIC MAXILLARY SINUSITIS: ICD-10-CM

## 2024-07-17 DIAGNOSIS — H90.3 SENSORINEURAL HEARING LOSS, BILATERAL: ICD-10-CM

## 2024-07-17 PROCEDURE — 31237 NSL/SINS NDSC SURG BX POLYPC: CPT | Mod: 50

## 2024-07-17 PROCEDURE — 99213 OFFICE O/P EST LOW 20 MIN: CPT | Mod: 25

## 2024-07-17 RX ORDER — METHENAMINE HIPPURATE 1 G/1
TABLET ORAL
Refills: 0 | Status: ACTIVE | COMMUNITY

## 2024-07-17 RX ORDER — MUPIROCIN 20 MG/G
2 OINTMENT TOPICAL
Qty: 1 | Refills: 3 | Status: ACTIVE | COMMUNITY
Start: 2024-07-17 | End: 1900-01-01

## 2024-07-17 RX ORDER — FLUCONAZOLE 200 MG/1
200 TABLET ORAL DAILY
Qty: 3 | Refills: 1 | Status: ACTIVE | COMMUNITY
Start: 2024-07-17 | End: 1900-01-01

## 2024-07-22 RX ORDER — CLINDAMYCIN HYDROCHLORIDE 300 MG/1
300 CAPSULE ORAL 3 TIMES DAILY
Qty: 30 | Refills: 1 | Status: ACTIVE | COMMUNITY
Start: 2024-07-22 | End: 1900-01-01

## 2024-07-24 LAB — EAR NOSE AND THROAT CULTURE: ABNORMAL

## 2024-08-07 ENCOUNTER — APPOINTMENT (OUTPATIENT)
Dept: OTOLARYNGOLOGY | Facility: CLINIC | Age: 69
End: 2024-08-07